# Patient Record
Sex: FEMALE | Race: WHITE | Employment: STUDENT | URBAN - METROPOLITAN AREA
[De-identification: names, ages, dates, MRNs, and addresses within clinical notes are randomized per-mention and may not be internally consistent; named-entity substitution may affect disease eponyms.]

---
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
## 2018-02-12 ENCOUNTER — OFFICE VISIT (OUTPATIENT)
Dept: FAMILY MEDICINE CLINIC | Age: 18
End: 2018-02-12

## 2018-02-12 VITALS
BODY MASS INDEX: 25.71 KG/M2 | SYSTOLIC BLOOD PRESSURE: 109 MMHG | OXYGEN SATURATION: 100 % | HEIGHT: 66 IN | WEIGHT: 160 LBS | TEMPERATURE: 98 F | DIASTOLIC BLOOD PRESSURE: 71 MMHG | HEART RATE: 57 BPM | RESPIRATION RATE: 18 BRPM

## 2018-02-12 DIAGNOSIS — S46.812A STRAIN OF LEFT LEVATOR SCAPULAE MUSCLE, INITIAL ENCOUNTER: Primary | ICD-10-CM

## 2018-02-12 DIAGNOSIS — M70.50 PES ANSERINE BURSITIS: ICD-10-CM

## 2018-02-12 RX ORDER — MELOXICAM 7.5 MG/1
7.5 TABLET ORAL DAILY
Qty: 30 TAB | Refills: 1 | Status: SHIPPED | OUTPATIENT
Start: 2018-02-12 | End: 2018-02-22 | Stop reason: DRUGHIGH

## 2018-02-12 NOTE — MR AVS SNAPSHOT
Zeferino Schneiderrichi [facility] [address] [facility] [address] [address] 
[phone] Patient: Sheree Rosa MRN: [id number] :2000 Visit Information Date & Time Provider Department Dept. Phone Encounter #  
 2018  2:15 PM Bari Zheng PA-C [address] [place] [address] [phone] [id number] Follow-up Instructions Return if symptoms worsen or fail to improve. Upcoming Health Maintenance Date Due Hepatitis A Peds Age 1-18 (1 of 2 - Standard Series) 1999 DTaP/Tdap/Td series (1 - Tdap) 2005 HPV AGE 9Y-26Y (1 of 3 - Female 3 Dose Series) 2009 Influenza Age 5 to Adult 2017 Allergies as of 2018  Review Complete On: 2018 By: Bari Zheng PA-C No Known Allergies Current Immunizations  Never Reviewed No immunizations on file. Not reviewed this visit You Were Diagnosed With   
  
 Codes Comments Strain of left levator scapulae muscle, initial encounter    -  Primary ICD-10-CM: Q31.874B ICD-9-CM: 840.8 Pes anserine bursitis     ICD-10-CM: M70.50 ICD-9-CM: 726.61 Vitals BP Pulse Temp Resp Height(growth percentile) Weight(growth percentile) 109/71 (46 %/ 75 %)* (BP 1 Location: Right arm, BP Patient Position: Sitting) (!) 57 98 °F (36.7 °C) (Oral) 18 5' 6\" (1.676 m) (75 %, Z= 0.67) 160 lb (72.6 kg) (87 %, Z= 1.13) LMP SpO2 BMI OB Status Smoking Status 2018 100% 25.82 kg/m2 (83 %, Z= 0.94) Having regular periods Never Smoker *BP percentiles are based on NHBPEP's 4th Report Growth percentiles are based on CDC 2-20 Years data. Vitals History BMI and BSA Data Body Mass Index Body Surface Area  
 25.82 kg/m 2 1.84 m 2 Preferred Pharmacy Pharmacy Name Phone [id number] [address] [place] [phone] Your Updated Medication List  
  
   
 This list is accurate as of: 2/12/18  3:18 PM.  Always use your most recent med list.  
  
  
  
  
 meloxicam 7.5 mg tablet Commonly known as:  MOBIC Take 1 Tab by mouth daily. ORSYTHIA 0.1-20 mg-mcg Tab Generic drug:  levonorgestrel-ethinyl estradiol Take  by mouth. ZyrTEC 10 mg tablet Generic drug:  cetirizine Take  by mouth. Prescriptions Sent to Pharmacy Refills  
 meloxicam (MOBIC) 7.5 mg tablet 1 Sig: Take 1 Tab by mouth daily. Class: Normal  
 Pharmacy: [address] [place] [address], [address] [place] [address] #: [phone] Route: Oral  
  
Follow-up Instructions Return if symptoms worsen or fail to improve. Patient Instructions For the shoulder: warm it up and stretch it daily. For the knee: warm it up prior to physical activity, static stretches after activity, then ice: 15-20 minutes every 1-2 hours. Take 2 Meloxicam daily with food. Avoid other NSAIDs (aspirin, Aleve, ibuprofen, Motrin, etc.). It's okay to take with Tylenol. Neck Strain or Sprain: Rehab Exercises Your Care Instructions Here are some examples of typical rehabilitation exercises for your condition. Start each exercise slowly. Ease off the exercise if you start to have pain. Your doctor or physical therapist will tell you when you can start these exercises and which ones will work best for you. How to do the exercises Neck rotation 1. Sit in a firm chair, or stand up straight. 2. Keeping your chin level, turn your head to the right, and hold for 15 to 30 seconds. 3. Turn your head to the left and hold for 15 to 30 seconds. 4. Repeat 2 to 4 times to each side. Neck stretches 1. Look straight ahead, and tip your right ear to your right shoulder. Do not let your left shoulder rise up as you tip your head to the right. 2. Hold for 15 to 30 seconds. 3. Tilt your head to the left.  Do not let your right shoulder rise up as you tip your head to the left. 4. Hold for 15 to 30 seconds. 5. Repeat 2 to 4 times to each side. Forward neck flexion 1. Sit in a firm chair, or stand up straight. 2. Bend your head forward. 3. Hold for 15 to 30 seconds. 4. Repeat 2 to 4 times. Lateral (side) bend strengthening 1. With your right hand, place your first two fingers on your right temple. 2. Start to bend your head to the side while using gentle pressure from your fingers to keep your head from bending. 3. Hold for about 6 seconds. 4. Repeat 8 to 12 times. 5. Switch hands and repeat the same exercise on your left side. Forward bend strengthening 1. Place your first two fingers of either hand on your forehead. 2. Start to bend your head forward while using gentle pressure from your fingers to keep your head from bending. 3. Hold for about 6 seconds. 4. Repeat 8 to 12 times. Neutral position strengthening 1. Using one hand, place your fingertips on the back of your head at the top of your neck. 2. Start to bend your head backward while using gentle pressure from your fingers to keep your head from bending. 3. Hold for about 6 seconds. 4. Repeat 8 to 12 times. Chin tuck 1. Lie on the floor with a rolled-up towel under your neck. Your head should be touching the floor. 2. Slowly bring your chin toward your chest. 
3. Hold for a count of 6, and then relax for up to 10 seconds. 4. Repeat 8 to 12 times. Follow-up care is a key part of your treatment and safety. Be sure to make and go to all appointments, and call your doctor if you are having problems. It's also a good idea to know your test results and keep a list of the medicines you take. Where can you learn more? Go to http://radha-murray.info/. Enter M679 in the search box to learn more about \"Neck Strain or Sprain: Rehab Exercises. \" Current as of: March 21, 2017 Content Version: 11.4 © 4091-3374 Healthwise, Incorporated. Care instructions adapted under license by [employer] (which disclaims liability or warranty for this information). If you have questions about a medical condition or this instruction, always ask your healthcare professional. [address] any warranty or liability for your use of this information. Follow along with Shannon Causey after warming up the neck/shoulder area:  
[email or web address] Fastest Neck Stretch to Stop Pain from Levator Scapulae Perform the exercises above 1-2 times daily (more if it feels good). Try them all for a few days, then you can skip the ones that don't seem helpful. Watch your posture. Introducing [facility]! Carmelita Fothergill introduces [employer] patient portal. Now you can access parts of your medical record, email your doctor's office, and request medication refills online. 1. In your internet browser, go to [email or web address]. [employer] 2. Click on the First Time User? Click Here link in the Sign In box. You will see the New Member Sign Up page. 3. Enter your [employer] Access Code exactly as it appears below. You will not need to use this code after youve completed the sign-up process. If you do not sign up before the expiration date, you must request a new code. · [employer] Access Code: [email or web address] Expires: 2/15/2018  3:05 PM 
 
4. Enter the last four digits of your Social Security Number (xxxx) and Date of Birth (mm/dd/yyyy) as indicated and click Submit. You will be taken to the next sign-up page. 5. Create a Quad/Graphicst ID. This will be your [employer] login ID and cannot be changed, so think of one that is secure and easy to remember. 6. Create a [employer] password. You can change your password at any time. 7. Enter your Password Reset Question and Answer. This can be used at a later time if you forget your password. 8. Enter your e-mail address. You will receive e-mail notification when new information is available in [address] 9. Click Sign Up. You can now view and download portions of your medical record. 10. Click the Download Summary menu link to download a portable copy of your medical information. If you have questions, please visit the Frequently Asked Questions section of the [employer] website. Remember, [employer] is NOT to be used for urgent needs. For medical emergencies, dial 911. Now available from your iPhone and Android! Please provide this summary of care documentation to your next provider. If you have any questions after today's visit, please call [phone].

## 2018-02-12 NOTE — LETTER
NOTIFICATION RETURN TO WORK 
 
2/12/2018 3:14 PM 
 
Ms. Cira Fragoso [place] [address] [place] [address] To Whom It May Concern: 
 
Cira Fragoso is currently under the care of [facility]. Please excuse her from running and rucking for the next week. If there are questions or concerns please have the patient contact our office. Sincerely, Manjeet Ardon PA-C

## 2018-02-12 NOTE — PROGRESS NOTES
Rm: 8    Chief Complaint   Patient presents with    Knee Pain     left knee x 4 days    Shoulder Pain     left shoulder x 4yrs     Flu Shot Requested: no    Depression Screening:  PHQ over the last two weeks 2/12/2018 11/17/2017   Little interest or pleasure in doing things Not at all Not at all   Feeling down, depressed or hopeless Not at all Not at all   Total Score PHQ 2 0 0       Learning Assessment:  Learning Assessment 11/17/2017   PRIMARY LEARNER Patient   HIGHEST LEVEL OF EDUCATION - PRIMARY LEARNER  SOME COLLEGE   BARRIERS PRIMARY LEARNER NONE   CO-LEARNER CAREGIVER No   PRIMARY LANGUAGE ENGLISH   LEARNER PREFERENCE PRIMARY LISTENING   ANSWERED BY patient   RELATIONSHIP SELF       Abuse Screening:  No flowsheet data found. Health Maintenance reviewed and discussed per provider: yes     Coordination of Care:    1. Have you been to the ER, urgent care clinic since your last visit? Hospitalized since your last visit? no    2. Have you seen or consulted any other health care providers outside of the [address] [place] [address] since your last visit? Include any pap smears or colon screening.  no

## 2018-02-12 NOTE — PATIENT INSTRUCTIONS
For the shoulder: warm it up and stretch it daily. For the knee: warm it up prior to physical activity, static stretches after activity, then ice: 15-20 minutes every 1-2 hours. Take 2 Meloxicam daily with food. Avoid other NSAIDs (aspirin, Aleve, ibuprofen, Motrin, etc.). It's okay to take with Tylenol. Neck Strain or Sprain: Rehab Exercises  Your Care Instructions  Here are some examples of typical rehabilitation exercises for your condition. Start each exercise slowly. Ease off the exercise if you start to have pain. Your doctor or physical therapist will tell you when you can start these exercises and which ones will work best for you. How to do the exercises  Neck rotation    1. Sit in a firm chair, or stand up straight. 2. Keeping your chin level, turn your head to the right, and hold for 15 to 30 seconds. 3. Turn your head to the left and hold for 15 to 30 seconds. 4. Repeat 2 to 4 times to each side. Neck stretches    1. Look straight ahead, and tip your right ear to your right shoulder. Do not let your left shoulder rise up as you tip your head to the right. 2. Hold for 15 to 30 seconds. 3. Tilt your head to the left. Do not let your right shoulder rise up as you tip your head to the left. 4. Hold for 15 to 30 seconds. 5. Repeat 2 to 4 times to each side. Forward neck flexion    1. Sit in a firm chair, or stand up straight. 2. Bend your head forward. 3. Hold for 15 to 30 seconds. 4. Repeat 2 to 4 times. Lateral (side) bend strengthening    1. With your right hand, place your first two fingers on your right temple. 2. Start to bend your head to the side while using gentle pressure from your fingers to keep your head from bending. 3. Hold for about 6 seconds. 4. Repeat 8 to 12 times. 5. Switch hands and repeat the same exercise on your left side. Forward bend strengthening    1. Place your first two fingers of either hand on your forehead.   2. Start to bend your head forward while using gentle pressure from your fingers to keep your head from bending. 3. Hold for about 6 seconds. 4. Repeat 8 to 12 times. Neutral position strengthening    1. Using one hand, place your fingertips on the back of your head at the top of your neck. 2. Start to bend your head backward while using gentle pressure from your fingers to keep your head from bending. 3. Hold for about 6 seconds. 4. Repeat 8 to 12 times. Chin tuck    1. Lie on the floor with a rolled-up towel under your neck. Your head should be touching the floor. 2. Slowly bring your chin toward your chest.  3. Hold for a count of 6, and then relax for up to 10 seconds. 4. Repeat 8 to 12 times. Follow-up care is a key part of your treatment and safety. Be sure to make and go to all appointments, and call your doctor if you are having problems. It's also a good idea to know your test results and keep a list of the medicines you take. Where can you learn more? Go to http://radha-murray.info/. Enter M679 in the search box to learn more about \"Neck Strain or Sprain: Rehab Exercises. \"  Current as of: March 21, 2017  Content Version: 11.4  © 2468-9132 Healthwise, Incorporated. Care instructions adapted under license by [employer] (which disclaims liability or warranty for this information). If you have questions about a medical condition or this instruction, always ask your healthcare professional. [place] [address] any warranty or liability for your use of this information. Follow along with Jose Saldaña after warming up the neck/shoulder area:   [email or web address]://[email or web address]  Fastest Neck Stretch to Stop Pain from Levator Scapulae    Perform the exercises above 1-2 times daily (more if it feels good). Try them all for a few days, then you can skip the ones that don't seem helpful. Watch your posture.

## 2018-02-12 NOTE — PROGRESS NOTES
HISTORY OF PRESENT ILLNESS  Dick Grant is a 21 y.o. female. HPI Comments: Pt presents with left knee and shoulder pain. She is in 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 Ibuprofen has been somewhat helpful. States the shoulder injury has been present since high school, where she played volleyball and soccer. Complains of 6/10 aching pain in the shoulder. She thinks she may have injured it with VB. Denies any specific injury diagnosis. The knee pain has been ongoing for about the past two weeks as she has been increasing her physical training. There is 5/10 aching pain in the knee, which becomes 7/10 sharp pain with walking and running. She was experiencing some soreness in that knee prior to falling in a hole (just above the knee in depth) during training, after which it hurt worse. She is not aware of any popping/clicking in the knee; or any twisting injury. She has been using an ACE wrap to support the knee which, has helped some. Knee Pain     Shoulder Pain    Pertinent negatives include no tingling. Review of Systems   Constitutional: Negative for chills and fever. Musculoskeletal: Positive for joint pain (left knee, left shoulder). Negative for back pain, myalgias and neck pain. Neurological: Negative for tingling. Visit Vitals    /71 (BP 1 Location: Right arm, BP Patient Position: Sitting)    Pulse (!) 57    Temp 98 °F (36.7 °C) (Oral)    Resp 18    Ht 5' 6\" (1.676 m)    Wt 160 lb (72.6 kg)    LMP 02/05/2018    SpO2 100%    BMI 25.82 kg/m2       Physical Exam   Constitutional: She is oriented to person, place, and time. Vital signs are normal. She appears well-developed and well-nourished. She is cooperative. She does not appear ill. No distress. HENT:   Head: Normocephalic and atraumatic. Right Ear: External ear normal.   Left Ear: External ear normal.   Nose: Nose normal. No nasal deformity.    Eyes: Conjunctivae are normal.   Neck: Trachea normal and normal range of motion. Neck supple. No spinous process tenderness present. No rigidity. Cardiovascular: Regular rhythm. Bradycardia present. Pulses:       Radial pulses are 2+ on the right side, and 2+ on the left side. Mild athletic bradycardia   Pulmonary/Chest: Effort normal. No respiratory distress. Musculoskeletal:        Right shoulder: Normal. She exhibits normal range of motion, no tenderness, no swelling and no deformity. Left shoulder: Normal. She exhibits normal range of motion, no swelling, no effusion and no crepitus. Left hip: Normal. She exhibits normal range of motion, no swelling, no crepitus and no deformity. Right knee: Normal. She exhibits normal range of motion, no swelling and no deformity. No tenderness found. Left knee: She exhibits normal range of motion, no swelling, no effusion, no deformity, normal alignment, no LCL laxity, normal patellar mobility and no MCL laxity. Tenderness (just distal to the medial condyle) found. No medial joint line, no lateral joint line and no patellar tendon tenderness noted. Left ankle: Normal. She exhibits no swelling, no ecchymosis and no deformity. Cervical back: Normal. She exhibits normal range of motion, no tenderness, no bony tenderness, no swelling, no edema and no deformity. Pt pain/tenderness is in the area of the left levator scapula muscle (midline neck to medial border of scapula near the superior angle). Stretching the right levator scap does not cause discomfort, but there is increased pain with stretching of the left levator scap. No abnormality noted in the shoulder joint proper   Lymphadenopathy:     She has no cervical adenopathy. Neurological: She is alert and oriented to person, place, and time.  strength normal and symmetric   Skin: Skin is warm, dry and intact. Ecchymosis noted.    Large area of yellow/purple bruising noted in the area of the left triceps   Psychiatric: She has a normal mood and affect. Her speech is normal and behavior is normal. Thought content normal.   Nursing note and vitals reviewed. ASSESSMENT and PLAN    ICD-10-CM ICD-9-CM    1. Strain of left levator scapulae muscle, initial encounter S46.812A 840.8 meloxicam (MOBIC) 7.5 mg tablet   2. Pes anserine bursitis M70.50 726.61 meloxicam (MOBIC) 7.5 mg tablet     Recommendations:  > For the shoulder: warm it up and stretch it daily. > For the knee: warm it up prior to physical activity, static stretches after activity, then ice: 15-20 minutes every 1-2 hours. > Take 2 Meloxicam daily with food. Avoid other NSAIDs (aspirin, Aleve, ibuprofen, Motrin, etc.). It's okay to take with Tylenol. Provided after-visit information on  Neck Strain or Sprain: Rehab Exercises    Also recommended a video from [employer] and [place] on relieving levator scapula pain. Pt verbalized understanding and agreement with the plan of care.     Kermit Claude, PA-C

## 2018-02-22 ENCOUNTER — OFFICE VISIT (OUTPATIENT)
Dept: ORTHOPEDIC SURGERY | Age: 18
End: 2018-02-22

## 2018-02-22 VITALS
WEIGHT: 161 LBS | HEART RATE: 75 BPM | HEIGHT: 66 IN | SYSTOLIC BLOOD PRESSURE: 117 MMHG | TEMPERATURE: 98 F | DIASTOLIC BLOOD PRESSURE: 73 MMHG | BODY MASS INDEX: 25.88 KG/M2 | RESPIRATION RATE: 15 BRPM

## 2018-02-22 DIAGNOSIS — M99.08 RIB CAGE REGION SOMATIC DYSFUNCTION: ICD-10-CM

## 2018-02-22 DIAGNOSIS — M70.50 PES ANSERINE BURSITIS: ICD-10-CM

## 2018-02-22 DIAGNOSIS — M99.02 THORACIC REGION SOMATIC DYSFUNCTION: ICD-10-CM

## 2018-02-22 DIAGNOSIS — M22.2X2 PATELLOFEMORAL DISORDER, LEFT: Primary | ICD-10-CM

## 2018-02-22 DIAGNOSIS — M99.05 PELVIC SOMATIC DYSFUNCTION: ICD-10-CM

## 2018-02-22 RX ORDER — MELOXICAM 15 MG/1
TABLET ORAL
Qty: 90 TAB | Refills: 0 | Status: SHIPPED | OUTPATIENT
Start: 2018-02-22 | End: 2018-05-18 | Stop reason: ALTCHOICE

## 2018-02-22 RX ORDER — MELOXICAM 15 MG/1
TABLET ORAL
Qty: 90 TAB | Refills: 0 | Status: SHIPPED | OUTPATIENT
Start: 2018-02-22 | End: 2018-02-22 | Stop reason: SDUPTHER

## 2018-02-22 NOTE — PROGRESS NOTES
HISTORY OF PRESENT ILLNESS    Eliezer Lobato is a 21y.o. year old female comes in today as new patient for: left knee pain    Patients symptoms have been present for 2-3 weeks. Pain level 6/10 now, It has worsened with sit for a while then stand and runing but not every time. Patient has tried:  rest and ice. It is described as pain left knee worsened with [employer] marching and running for [employer] the fell into a hole at [facility] about a week ago and worsened. No swell. Also pain left upper shoulder with running. Social History     Social History    Marital status: SINGLE     Spouse name: N/A    Number of children: N/A    Years of education: N/A     Social History Main Topics    Smoking status: Never Smoker    Smokeless tobacco: Never Used    Alcohol use Yes      Comment: occ    Drug use: No    Sexual activity: Yes     Partners: Male     Birth control/ protection: Condom     Other Topics Concern    None     Social History Narrative     Current Outpatient Prescriptions   Medication Sig Dispense Refill    meloxicam (MOBIC) 7.5 mg tablet Take 1 Tab by mouth daily. 30 Tab 1    cetirizine (ZYRTEC) 10 mg tablet Take  by mouth.  levonorgestrel-ethinyl estradiol (ORSYTHIA) 0.1-20 mg-mcg tab Take  by mouth. History reviewed. No pertinent past medical history. Family History   Problem Relation Age of Onset    Cancer Mother     Thyroid Disease Father          ROS:  No numb. All other systems reviewed and negative aside from that written in the HPI. Objective:  Visit Vitals    /73    Pulse 75    Temp 98 °F (36.7 °C)    Resp 15    Ht 5' 6\" (1.676 m)    Wt 161 lb (73 kg)    LMP 02/05/2018    BMI 25.99 kg/m2     GEN: Appears stated age in NAD. HEAD:  Normocephalic, atraumatic. NEURO:  Sensation intact light touch B/L lower extremities. MS:  LE Strength +5/5 bilateral .   left Knee:  No Effusion . Lachman negative. Valgus negative. Varus negative.   negative joint line tenderness medial.  Francis negative. Posterior drawer negative. Noble compression test negative. Patellar apprehension negative. Patellar facet  positive tender to palpation medial no crepitance left. ASIS/med mall/iliac crest high left. TTA T3 left and rib 3 posterior left. Examined seated and supine. EXT: no clubbing/cyanosis. no edema. SKIN: Warm/dry without rash. HEENT: Conjunctiva/lids WNL. External canals/nares WNL. Tongue midline. PERRL, EOMI. Hearing intact. NECK: Trachea midline. Supple, Full ROM. No thyromegaly. CARDIAC: No edema. LUNGS: Normal effort. ABD: Soft, no masses. No HSM. PSYCH: A+O x3. Appropriate judgment and insight. Assessment/Plan:   Encounter Diagnoses   Name Primary?  Patellofemoral disorder, left Yes    Pes anserine bursitis     Thoracic region somatic dysfunction     Rib cage region somatic dysfunction     Pelvic somatic dysfunction      Orders Placed This Encounter    REFERRAL TO PHYSICAL THERAPY     Referral Type:   PT/OT/ST     Referral Reason:   Specialty Services Required    TN OSTEOPATHIC MANIP,3-4 BODY REGN    meloxicam (MOBIC) 15 mg tablet     Sig: Take 1 tab daily as needed pain with food. Dispense:  90 Tab     Refill:  0       Verbal consent obtained. Thoracic, Rib and Pelvic SD treated with ME, HVLA and Indirect. Correction of previous malalignments verified after Tx. Pt tolerated well. Notes improvement of Sx and pain is now rated 2-3/10. HEP/stretches daily. Discussed stretching/strengthening/posture. Patient verbalizes understanding of evaluation and plan. Will start PT and use mobic and ice and RTC 4 weeks.

## 2018-02-22 NOTE — PATIENT INSTRUCTIONS
[email or web address]    Search YouTube for my channel:    Dr. Vidhya Corley    Neck/Upper back    Runner's Knee         Patellofemoral Pain Syndrome (Runner's Knee): Exercises  Your Care Instructions  Here are some examples of typical rehabilitation exercises for your condition. Start each exercise slowly. Ease off the exercise if you start to have pain. Your doctor or physical therapist will tell you when you can start these exercises and which ones will work best for you. How to do the exercises  Calf wall stretch    1. Stand facing a wall with your hands on the wall at about eye level. Put your affected leg about a step behind your other leg. 2. Keeping your back leg straight and your back heel on the floor, bend your front knee and gently bring your hip and chest toward the wall until you feel a stretch in the calf of your back leg. 3. Hold the stretch for at least 15 to 30 seconds. 4. Repeat 2 to 4 times. 5. Repeat steps 1 through 4, but this time keep your back knee bent. Quadriceps stretch    1. If you are not steady on your feet, hold on to a chair, counter, or wall. 2. Bend your affected leg, and reach behind you to grab the front of your foot or ankle with the hand on the same side. For example, if you are stretching your right leg, use your right hand. 3. Keeping your knees next to each other, pull your foot toward your buttock until you feel a gentle stretch across the front of your hip and down the front of your thigh. Your knee should be pointed directly to the ground, and not out to the side. 4. Hold the stretch for at least 15 to 30 seconds. 5. Repeat 2 to 4 times. Hamstring wall stretch    1. Lie on your back in a doorway, with your good leg through the open door. 2. Slide your affected leg up the wall to straighten your knee. You should feel a gentle stretch down the back of your leg. 1. Do not arch your back. 2. Do not bend either knee.   3. Keep one heel touching the floor and the other heel touching the wall. Do not point your toes. 3. Hold the stretch for at least 1 minute. Then over time, try to lengthen the time you hold the stretch to as long as 6 minutes. 4. Repeat 2 to 4 times. 5. If you do not have a place to do this exercise in a doorway, there is another way to do it:  6. Lie on your back, and bend your affected leg. 7. Loop a towel under the ball and toes of that foot, and hold the ends of the towel in your hands. 8. Straighten your knee, and slowly pull back on the towel. You should feel a gentle stretch down the back of your leg. 9. Hold the stretch for at least 15 to 30 seconds. Or even better, hold the stretch for 1 minute if you can. 10. Repeat 2 to 4 times. Quad sets    1. Sit with your affected leg straight and supported on the floor or a firm bed. Place a small, rolled-up towel under your affected knee. Your other leg should be bent, with that foot flat on the floor. 2. Tighten the thigh muscles of your affected leg by pressing the back of your knee down into the towel. 3. Hold for about 6 seconds, then rest for up to 10 seconds. 4. Repeat 8 to 12 times. Straight-leg raises to the front    1. Lie on your back with your good knee bent so that your foot rests flat on the floor. Your affected leg should be straight. Make sure that your low back has a normal curve. You should be able to slip your hand in between the floor and the small of your back, with your palm touching the floor and your back touching the back of your hand. 2. Tighten the thigh muscles in your affected leg by pressing the back of your knee flat down to the floor. Hold your knee straight. 3. Keeping the thigh muscles tight and your leg straight, lift your affected leg up so that your heel is about 12 inches off the floor. 4. Hold for about 6 seconds, then lower your leg slowly. Rest for up to 10 seconds between repetitions.   5. Repeat 8 to 12 times.  Straight-leg raises to the back    1. Lie on your stomach, and lift your leg straight up behind you (toward the ceiling). 2. Lift your toes about 6 inches off the floor, hold for about 6 seconds, then lower slowly. 3. Do 8 to 12 repetitions. Wall slide with ball squeeze    1. Stand with your back against a wall and with your feet about shoulder-width apart. Your feet should be about 12 inches away from the wall. 2. Put a ball about the size of a soccer ball between your knees. Then slowly slide down the wall until your knees are bent about 20 to 30 degrees. 3. Tighten your thigh muscles by squeezing the ball between your knees. Hold that position for about 10 seconds, then stop squeezing. Rest for up to 10 seconds between repetitions. 4. Repeat 8 to 12 times. Follow-up care is a key part of your treatment and safety. Be sure to make and go to all appointments, and call your doctor if you are having problems. It's also a good idea to know your test results and keep a list of the medicines you take. Where can you learn more? Go to http://radha-murray.info/. Enter A404 in the search box to learn more about \"Patellofemoral Pain Syndrome (Runner's Knee): Exercises. \"  Current as of: March 21, 2017  Content Version: 11.4  © 8075-4813 [employer]. Care instructions adapted under license by [employer] (which disclaims liability or warranty for this information). If you have questions about a medical condition or this instruction, always ask your healthcare professional. [place] [address] any warranty or liability for your use of this information.

## 2018-02-22 NOTE — LETTER
2/22/2018 8:59 AM 
 
Ms. Juan Francisco Dill [place] [facility] [address] [address] Run and march at own pace and distance. Like resolved in 3-4 weeks.  
 
 
Sincerely, 
 
 
Mae Cabral, DO

## 2018-02-22 NOTE — MR AVS SNAPSHOT
[address] [place] [address] [place], [address] [facility] [id number] 
[phone] Patient: Anderson Mulligan MRN: [id number] :2000 Visit Information Date & Time Provider Department Dept. Phone Encounter #  
 2018  8:20 AM Aparna Price, [address] [facility] [phone] [id number] Upcoming Health Maintenance Date Due Hepatitis A Peds Age 1-18 (1 of 2 - Standard Series) 1999 DTaP/Tdap/Td series (1 - Tdap) 2005 HPV AGE 9Y-26Y (1 of 3 - Female 3 Dose Series) 2009 Influenza Age 5 to Adult 2017 Allergies as of 2018  Review Complete On: 2018 By: Aparna Price DO No Known Allergies Current Immunizations  Never Reviewed No immunizations on file. Not reviewed this visit You Were Diagnosed With   
  
 Codes Comments Patellofemoral disorder, left    -  Primary ICD-10-CM: M22.2X2 ICD-9-CM: 719.96 Pes anserine bursitis     ICD-10-CM: M70.50 ICD-9-CM: 726.61 Thoracic region somatic dysfunction     ICD-10-CM: M99.02 
ICD-9-CM: 739.2 Rib cage region somatic dysfunction     ICD-10-CM: M99.08 
ICD-9-CM: 739.8 Pelvic somatic dysfunction     ICD-10-CM: M99.05 
ICD-9-CM: 739.5 Vitals BP Pulse Temp Resp Height(growth percentile) Weight(growth percentile) 117/73 (75 %/ 80 %)* 75 98 °F (36.7 °C) 15 5' 6\" (1.676 m) (75 %, Z= 0.67) 161 lb (73 kg) (88 %, Z= 1.16) LMP BMI OB Status Smoking Status 2018 25.99 kg/m2 (83 %, Z= 0.97) Having regular periods Never Smoker *BP percentiles are based on NHBPEP's 4th Report Growth percentiles are based on CDC 2-20 Years data. Vitals History BMI and BSA Data Body Mass Index Body Surface Area  
 25.99 kg/m 2 1.84 m 2 Preferred Pharmacy Pharmacy Name Phone [facility]Y #[id number] [place], [address] [facility] Dann Johnson [phone] Your Updated Medication List  
  
   
 This list is accurate as of 2/22/18  9:03 AM.  Always use your most recent med list.  
  
  
  
  
 meloxicam 15 mg tablet Commonly known as:  MOBIC Take 1 tab daily as needed pain with food. ORSYTHIA 0.1-20 mg-mcg Tab Generic drug:  levonorgestrel-ethinyl estradiol Take  by mouth. ZyrTEC 10 mg tablet Generic drug:  cetirizine Take  by mouth. Prescriptions Sent to Pharmacy Refills  
 meloxicam (MOBIC) 15 mg tablet 0 Sig: Take 1 tab daily as needed pain with food. Class: Normal  
 Pharmacy: [address] [place] [address], [address] [place] [address] #: [phone] We Performed the Following NE OSTEOPATHIC MANIP,3-4 BODY REGN [id number] CPT(R)] REFERRAL TO PHYSICAL THERAPY [QLA63 Custom] Comments:  
 Eval and Tx 
 
2-3 per week for 3-4 weeks 
 
 
[phone] Referral Information Referral ID Referred By Referred To  
  
 [id number] [facility]   
   [address] [facility] [address] [facility], [address] [place] [address] Phone: [phone] Fax: [phone] Visits Status Start Date End Date 1 New Request 2/22/18 2/22/19 If your referral has a status of pending review or denied, additional information will be sent to support the outcome of this decision. Patient Instructions   
[email or web address] Search YouTube for my channel: 
 
Dr. Erma Villalpando Neck/Upper back Runner's Knee Patellofemoral Pain Syndrome (Runner's Knee): Exercises Your Care Instructions Here are some examples of typical rehabilitation exercises for your condition. Start each exercise slowly. Ease off the exercise if you start to have pain. Your doctor or physical therapist will tell you when you can start these exercises and which ones will work best for you. How to do the exercises Calf wall stretch 1. Stand facing a wall with your hands on the wall at about eye level.  Put your affected leg about a step behind your other leg. 2. Keeping your back leg straight and your back heel on the floor, bend your front knee and gently bring your hip and chest toward the wall until you feel a stretch in the calf of your back leg. 3. Hold the stretch for at least 15 to 30 seconds. 4. Repeat 2 to 4 times. 5. Repeat steps 1 through 4, but this time keep your back knee bent. Quadriceps stretch 1. If you are not steady on your feet, hold on to a chair, counter, or wall. 2. Bend your affected leg, and reach behind you to grab the front of your foot or ankle with the hand on the same side. For example, if you are stretching your right leg, use your right hand. 3. Keeping your knees next to each other, pull your foot toward your buttock until you feel a gentle stretch across the front of your hip and down the front of your thigh. Your knee should be pointed directly to the ground, and not out to the side. 4. Hold the stretch for at least 15 to 30 seconds. 5. Repeat 2 to 4 times. Hamstring wall stretch 1. Lie on your back in a doorway, with your good leg through the open door. 2. Slide your affected leg up the wall to straighten your knee. You should feel a gentle stretch down the back of your leg. 1. Do not arch your back. 2. Do not bend either knee. 3. Keep one heel touching the floor and the other heel touching the wall. Do not point your toes. 3. Hold the stretch for at least 1 minute. Then over time, try to lengthen the time you hold the stretch to as long as 6 minutes. 4. Repeat 2 to 4 times. 5. If you do not have a place to do this exercise in a doorway, there is another way to do it: 6. Lie on your back, and bend your affected leg. 7. Loop a towel under the ball and toes of that foot, and hold the ends of the towel in your hands. 8. Straighten your knee, and slowly pull back on the towel. You should feel a gentle stretch down the back of your leg. 9. Hold the stretch for at least 15 to 30 seconds. Or even better, hold the stretch for 1 minute if you can. 10. Repeat 2 to 4 times. [facility] 1. Sit with your affected leg straight and supported on the floor or a firm bed. Place a small, rolled-up towel under your affected knee. Your other leg should be bent, with that foot flat on the floor. 2. Tighten the thigh muscles of your affected leg by pressing the back of your knee down into the towel. 3. Hold for about 6 seconds, then rest for up to 10 seconds. 4. Repeat 8 to 12 times. Straight-leg raises to the front 1. Lie on your back with your good knee bent so that your foot rests flat on the floor. Your affected leg should be straight. Make sure that your low back has a normal curve. You should be able to slip your hand in between the floor and the small of your back, with your palm touching the floor and your back touching the back of your hand. 2. Tighten the thigh muscles in your affected leg by pressing the back of your knee flat down to the floor. Hold your knee straight. 3. Keeping the thigh muscles tight and your leg straight, lift your affected leg up so that your heel is about 12 inches off the floor. 4. Hold for about 6 seconds, then lower your leg slowly. Rest for up to 10 seconds between repetitions. 5. Repeat 8 to 12 times. Straight-leg raises to the back 1. Lie on your stomach, and lift your leg straight up behind you (toward the ceiling). 2. Lift your toes about 6 inches off the floor, hold for about 6 seconds, then lower slowly. 3. Do 8 to 12 repetitions. Wall slide with ball squeeze 1. Stand with your back against a wall and with your feet about shoulder-width apart. Your feet should be about 12 inches away from the wall. 2. Put a ball about the size of a soccer ball between your knees. Then slowly slide down the wall until your knees are bent about 20 to 30 degrees. 3. Tighten your thigh muscles by squeezing the ball between your knees. Hold that position for about 10 seconds, then stop squeezing. Rest for up to 10 seconds between repetitions. 4. Repeat 8 to 12 times. Follow-up care is a key part of your treatment and safety. Be sure to make and go to all appointments, and call your doctor if you are having problems. It's also a good idea to know your test results and keep a list of the medicines you take. Where can you learn more? Go to http://radha-murray.info/. Enter A404 in the search box to learn more about \"Patellofemoral Pain Syndrome (Runner's Knee): Exercises. \" Current as of: March 21, 2017 Content Version: 11.4 © 7088-8559 Healthwise, Incorporated. Care instructions adapted under license by [employer] (which disclaims liability or warranty for this information). If you have questions about a medical condition or this instruction, always ask your healthcare professional. [place] [address] any warranty or liability for your use of this information. Introducing [facility] & HEALTH SERVICES! [facility] introduces Celotor patient portal. Now you can access parts of your medical record, email your doctor's office, and request medication refills online. 1. In your internet browser, go to [email or web address] 2. Click on the First Time User? Click Here link in the Sign In box. You will see the New Member Sign Up page. 3. Enter your Celotor Access Code exactly as it appears below. You will not need to use this code after youve completed the sign-up process. If you do not sign up before the expiration date, you must request a new code. · [employer]otor Access Code: [email or web address] Expires: 5/23/2018  8:05 AM 
 
4. Enter the last four digits of your Social Security Number (xxxx) and Date of Birth (mm/dd/yyyy) as indicated and click Submit. You will be taken to the next sign-up page. 5. Create a Temptster ID. This will be your Temptster login ID and cannot be changed, so think of one that is secure and easy to remember. 6. Create a Temptster password. You can change your password at any time. 7. Enter your Password Reset Question and Answer. This can be used at a later time if you forget your password. 8. Enter your e-mail address. You will receive e-mail notification when new information is available in [address] 9. Click Sign Up. You can now view and download portions of your medical record. 10. Click the Download Summary menu link to download a portable copy of your medical information. If you have questions, please visit the Frequently Asked Questions section of the Temptster website. Remember, Temptster is NOT to be used for urgent needs. For medical emergencies, dial 911. Now available from your iPhone and Android! Please provide this summary of care documentation to your next provider. If you have any questions after today's visit, please call [phone].

## 2018-03-06 ENCOUNTER — HOSPITAL ENCOUNTER (OUTPATIENT)
Dept: PHYSICAL THERAPY | Age: 18
Discharge: HOME | End: 2018-03-06
Payer: OTHER GOVERNMENT

## 2018-03-06 PROCEDURE — 97110 THERAPEUTIC EXERCISES: CPT

## 2018-03-06 PROCEDURE — 97161 PT EVAL LOW COMPLEX 20 MIN: CPT

## 2018-03-06 NOTE — PROGRESS NOTES
PHYSICAL THERAPY - DAILY TREATMENT NOTE    Patient Name: Geraldine Lucas        Date: 3/6/2018  : 2000   YES Patient  Verified  Visit #:   1     Insurance: Payor:  / Plan: [place] AND DEPENDENTS / Product Type:  /      In time: 4:00 Out time: 5:00   Total Treatment Time: 60     Medicare Time Tracking (below)   Total Timed Codes (min):  na 1:1 Treatment Time:  na     TREATMENT AREA =  Left knee    SUBJECTIVE    Pain Level (on 0 to 10 scale):  0  / 10   Medication Changes/New allergies or changes in medical history, any new surgeries or procedures? NO    If yes, update Summary List   Subjective Functional Status/Changes:  []  No changes reported     See Eval          OBJECTIVE      10 min Therapeutic Exercise:  [x]  See flow sheet   Rationale:      increase ROM and increase strength to improve the patients ability to run with less pain      min Patient Education:  YES  Reviewed HEP   []  Progressed/Changed HEP based on:   Issued HEP     Other Objective/Functional Measures:    See Eval     Post Treatment Pain Level (on 0 to 10) scale:   0  / 10     ASSESSMENT    Assessment/Changes in Function:     Justification for Eval Code Complexity: Moderate  Patient History (low 0, mod 1-2, high 3-4):  Moderate-Hx left knee pain   Examination (low 1-2, mod 3+, high 4+): Decreased strength/balance/activity tolerance-moderate   Clinical Presentation (low: stable/uncomplicated; mod: evolving; high: unstable/unpredictable): low  Clinical Decision Making (low , mod 26-74, high 1-25): FOTO Moderate         []  See Progress Note/Recertification   Patient will continue to benefit from skilled PT services to analyze,, cue,, progress,, modify,, demonstrate,, instruct, and address, movement patterns,, therapeutic interventions,, postural abnormalities,, soft tissue restrictions,, ROM,, strength,, functional mobility,, body mechanics/ergonomics, and home and community integration, to attain remaining goals.    Progress toward goals / Updated goals:    Goals set per POC     PLAN    []  Upgrade activities as tolerated YES Continue plan of care   []  Discharge due to :    []  Other:      Therapist: Joselyn Montano DPT    Date: 3/6/2018 Time: 5:09 PM   Future Appointments  Date Time Provider Adolfo Shay   3/9/2018 7:30 AM Eli Patel [facility]   3/13/2018 3:00 PM Franca Melton PT [facility]   3/20/2018 3:00 PM Eli Patel PT [facility]   3/28/2018 2:30 PM Eli Patel, [facility]

## 2018-03-06 NOTE — PROGRESS NOTES
[address] [facility] PHYSICAL THERAPY  [address] [facility] [address], [place], [address] - Phone: [phone]  Fax: [phone] / [address] [facility]  Patient Name: Steven May : 2000   Medical   Diagnosis: Left knee pain [M25.562] Treatment Diagnosis: Left knee pes anserine bursitis, PFS   Onset Date: 2018     Referral Source: Migdalia Worthy DO Start of Care [facility]): 3/6/2018   Prior Hospitalization: See medical history Provider #: [id number]   Prior Level of Function: RTC, Rucking, Running, Recreational gym   Comorbidities: Hx of left knee pain   Medications: Verified on Patient Summary List   The Plan of Care and following information is based on the information from the initial evaluation.   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 She was able to continue and finish her run and self treated with ice however pain continued. Pt know reports pain only with running/sprinting/stair climbing. S/S consistent with Dx as pt is TTP over pes anserine. (-) Francis/Thessaly/Ligament testing today. Noted decreased left hip MMT abd at 4-/5 and add 4/5. Pt also with noted asymmetrical innominates which may have an effect on hip musculature length tension relationship. Trialed MET to correct today without success. Will plan to continue to monitor throughout sessions.  Pt would benefit from continued skilled PT to progress strength and stability for increased ease with return to running.   ==========================================================================================  Eval Complexity: History: MEDIUM  Complexity : 1-2 comorbidities / personal factors will impact the outcome/ POC Exam:MEDIUM Complexity : 3 Standardized tests and measures addressing body structure, function, activity limitation and / or participation in recreation  Presentation: LOW Complexity : Stable, uncomplicated  Clinical Decision Making:MEDIUM Complexity : FOTO score of 26-74Overall Complexity:LOW     Problem List: pain affecting function, decrease strength, impaired gait/ balance, decrease ADL/ functional abilitiies, decrease activity tolerance and decrease flexibility/ joint mobility   Treatment Plan may include any combination of the following: Therapeutic exercise, Therapeutic activities, Neuromuscular re-education, Physical agent/modality, Gait/balance training, Manual therapy, Aquatic therapy, Patient education, Self Care training, Functional mobility training, Home safety training, Stair training and Other: iontophoresis  Patient / Family readiness to learn indicated by: asking questions, trying to perform skills and interest  Persons(s) to be included in education: patient (P)  Barriers to Learning/Limitations: None  Measures taken: FS FOTO score 70 indicating 30% functional disability   Patient Goal (s): \"Run without pain\"   Patient self reported health status: excellent  Rehabilitation Potential: excellent   Short Term Goals: To be accomplished in  2  weeks:  1. Pt will be compliant with HEP for symptom management at home. 2. Pt will report >/= 50% improvement negt steps for increased ease on campus.  Long Term Goals: To be accomplished in  4  weeks:  1. Pt will be independent with HEP at D/C for self management. 2. Pt will report ability to run 1 mile without increased pain for ease with [employer]. 3. Pt will increase left hip abd MMT to >/= 4/5 for increased stability on unlevel surfaces.    Frequency / Duration:   Patient to be seen  1-2  times per week for 4  weeks:  Patient / Caregiver education and instruction: self care, activity modification and exercises  G-Codes (GP): NA  Therapist Signature: Max Costello PT Date: 9/7/9183   Certification Period: NA Time: 5:06 PM   ===========================================================================================  I certify that the above Physical Therapy Services are being furnished while the patient is under my care. I agree with the treatment plan and certify that this therapy is necessary. Physician Signature:        Date:       Time:     Please sign and return to [employer] or you may fax the signed copy to [phone]. Thank you.

## 2018-03-09 ENCOUNTER — HOSPITAL ENCOUNTER (OUTPATIENT)
Dept: PHYSICAL THERAPY | Age: 18
Discharge: HOME | End: 2018-03-09
Payer: OTHER GOVERNMENT

## 2018-03-09 PROCEDURE — 97033 APP MDLTY 1+IONTPHRSIS EA 15: CPT

## 2018-03-09 PROCEDURE — 97140 MANUAL THERAPY 1/> REGIONS: CPT

## 2018-03-09 PROCEDURE — 97110 THERAPEUTIC EXERCISES: CPT

## 2018-03-09 NOTE — PROGRESS NOTES
PHYSICAL THERAPY - DAILY TREATMENT NOTE    Patient Name: Anderson Mulligan        Date: 3/9/2018  : 2000   YES Patient  Verified  Visit #:   2   of   4-6  Insurance: Payor: [facility] / Plan: Flavia Zarate DEPENDENTS / Product Type: Maykel Yovany /      In time: 7:30 Out time: 8:15   Total Treatment Time: 45     TREATMENT AREA = Left knee pain [M25.562]    SUBJECTIVE    Pain Level (on 0 to 10 scale):  0  / 10   Medication Changes/New allergies or changes in medical history, any new surgeries or procedures? NO0    If yes, update Summary List   Subjective Functional Status/Changes:  []  No changes reported     \"When I walk down the steps I feel it on my knee and my (Right SIJ) back. When  Dr. Rad Lloyd, I had originally complained about my left shoulder too but he said it was a rib issue and popped it back in. But I noticed it yesterday it felt like it was coming back. \"          OBJECTIVE  Therapeutic Procedures:  Min Procedure Specifics + Rationale   n/a [x]  Patient Education (performed throughout session) [x] Review HEP    [] Progressed/Changed HEP based on:   [] proper performance and advancement of Therex/TA   [] reduction in pain level    [] increased functional capacity       [] change in directional preference   29 [x] Therapeutic Exercise   [x]  See Flowsheet   Rationale: increase ROM and increase strength to improve the patients ability to participate in ADL's    8 []  Manual Therapy [] STM/DTM     [] IASTM     [] Scar Massage  [] X-friction       [] PNF         [] PROM    [] TDN (see objective; actual needle insertion time not billed)   [] Soft tissue mobz   [] Joint mobz: Gr I [] II []  III [] IV[] V[]:  Right SIJ HVLAT in Lateral Recumbent with Lateral Flex/Ext/Rot * Body Drop  Prone extension mobilization on Right SIJ with Right Hip Extension mobilization  Left fibular anterior manipulation with flexion.    Rationale: decrease pain, increase ROM, increase tissue extensibility, decrease trigger points and increase postural awareness to attain functional use and participation with ADL's     Modality rationale: decrease inflammation, decrease pain, increase tissue extensibility and increase muscle contraction/control to improve the patients ability to perform ADL's with greater ease   Min Type Additional Details   8 [x]  Iontophoresis with dexamethasone                         [x] 40 mA             [] 80 mA [x] In clinic  [x] Take home patch   [] Skin assessment post-treatment:  []intact []redness- no adverse reaction       []redness  adverse reaction:     Other Objective/Functional Measures:    Patient educated on purpose of manipulations, neurophysiological effects, biochemical effects, and biomechanical effects in relation to her condition. She verbalized understanding. Patient assessed for appropriateness pre-HVLAT, after which consent to proceed was given. Therapist discussed post-manipulation effects and any adverse reactions (if appropriate) to determine further PT intervention through manipulation. Patient reported relief of pain with stair descent to Right L/S. Advised patient to trial standing repeated Right Hip Extension in standing. Educated patient on iontophoresis   Patient given Supine Scap Stab #1 to address Left shoulder discomfort. Post Treatment Pain Level (on 0 to 10) scale:   0  / 10     ASSESSMENT    Assessment/Changes in Function:     Difficulty attaining stretch to adductors with belt.   []  See Progress Note/ Recertification  []  See Discharge Summary        Patient will continue to benefit from skilled PT services to modify and progress therapeutic interventions, address functional mobility deficits, address ROM deficits, address strength deficits, analyze and address soft tissue restrictions, analyze and cue movement patterns, analyze and modify body mechanics/ergonomics and instruct in home and community integration  to attain remaining goals   Progress toward goals / Updated goals:    1st session since initial eval, no significant progress noted. PLAN    [x]  Upgrade activities as tolerated  [x]  Update interventions per flow sheet YES Continue plan of care   []  Discharge due to :    []  Other:      Therapist: Messi Hdez \"BJ\" BERNARDINO Eubanks, Cert. MDT, Cert. DN, Cert.  SMT    Date: 3/9/2018 Time: 7:38 AM   Future Appointments  Date Time Provider Adolfo Shay   3/13/2018 3:00 PM Venkatesh Sotelo, PT [facility]   3/20/2018 3:00 PM Dayanara Shine PT [facility]   3/28/2018 2:30 PM Dayanara Shine PT [facility]

## 2018-03-13 ENCOUNTER — HOSPITAL ENCOUNTER (OUTPATIENT)
Dept: PHYSICAL THERAPY | Age: 18
Discharge: HOME | End: 2018-03-13
Payer: OTHER GOVERNMENT

## 2018-03-13 PROCEDURE — 97110 THERAPEUTIC EXERCISES: CPT

## 2018-03-13 NOTE — PROGRESS NOTES
PHYSICAL THERAPY - DAILY TREATMENT NOTE    Patient Name: Francisco Salinas        Date: 3/13/2018  : 2000   YES Patient  Verified  Visit #:   3   of   8  Insurance: Payor:  / Plan: [facility]  RETIREES AND DEPENDENTS / Product Type:  /      In time: 3:00 Out time: 3:30   Total Treatment Time: 30     Medicare Time Tracking (below)   Total Timed Codes (min):  na 1:1 Treatment Time:  na     TREATMENT AREA =  Left knee    SUBJECTIVE    Pain Level (on 0 to 10 scale):  0  / 10   Medication Changes/New allergies or changes in medical history, any new surgeries or procedures? NO    If yes, update Summary List   Subjective Functional Status/Changes:  []  No changes reported     \"I didn't have pain on stairs so that's an improvement. I have an 8 mile ruck tomorrow\"          OBJECTIVE      30 min Therapeutic Exercise:  [x]  See flow sheet   Rationale:      increase ROM, increase strength and kinesthetic sense to improve the patients ability to run without pain      min Patient Education:  YES  Reviewed HEP   []  Progressed/Changed HEP based on:   Updated HEP     Other Objective/Functional Measures:    Instructed to perform butt burners prior to ruck tomorrow  VC for symmetrical WBing during squats and nuetral hip and knee   Reported right LBP with left half moon, changes to at stair without complaints    Post Treatment Pain Level (on 0 to 10) scale:   0  / 10     ASSESSMENT    Assessment/Changes in Function:     Good kinesthetic sense following VC     []  See Progress Note/Recertification   Patient will continue to benefit from skilled PT services to analyze,, cue,, progress,, modify,, demonstrate,, instruct, and address, movement patterns,, therapeutic interventions,, postural abnormalities,, soft tissue restrictions,, ROM,, strength,, functional mobility,, body mechanics/ergonomics, and home and community integration, to attain remaining goals.    Progress toward goals / Updated goals:    Reviewed HEP     PLAN    []  Upgrade activities as tolerated YES Continue plan of care   []  Discharge due to :    []  Other:      Therapist: Fly Carranza DPT    Date: 3/13/2018 Time: 3:46 PM   Future Appointments  Date Time Provider Adolfo Shay   3/20/2018 3:00 PM Joanna Vivar PT [facility]   3/28/2018 2:30 PM Joanna Vivar PT [facility]

## 2018-03-20 ENCOUNTER — APPOINTMENT (OUTPATIENT)
Dept: PHYSICAL THERAPY | Age: 18
End: 2018-03-20
Payer: OTHER GOVERNMENT

## 2018-05-18 ENCOUNTER — OFFICE VISIT (OUTPATIENT)
Dept: FAMILY MEDICINE CLINIC | Age: 18
End: 2018-05-18

## 2018-05-18 VITALS
HEART RATE: 74 BPM | RESPIRATION RATE: 18 BRPM | BODY MASS INDEX: 27 KG/M2 | HEIGHT: 66 IN | WEIGHT: 168 LBS | SYSTOLIC BLOOD PRESSURE: 110 MMHG | OXYGEN SATURATION: 97 % | TEMPERATURE: 98.2 F | DIASTOLIC BLOOD PRESSURE: 62 MMHG

## 2018-05-18 DIAGNOSIS — L30.9 ECZEMA, UNSPECIFIED TYPE: Primary | ICD-10-CM

## 2018-05-18 DIAGNOSIS — L70.0 ACNE VULGARIS: ICD-10-CM

## 2018-05-18 DIAGNOSIS — J30.1 SEASONAL ALLERGIC RHINITIS DUE TO POLLEN: ICD-10-CM

## 2018-05-18 DIAGNOSIS — R06.2 WHEEZING: ICD-10-CM

## 2018-05-18 RX ORDER — DESONIDE 0.5 MG/G
CREAM TOPICAL 2 TIMES DAILY
Qty: 60 G | Refills: 0 | Status: SHIPPED | OUTPATIENT
Start: 2018-05-18 | End: 2018-06-15

## 2018-05-18 RX ORDER — ALBUTEROL SULFATE 90 UG/1
2 AEROSOL, METERED RESPIRATORY (INHALATION)
Qty: 3 INHALER | Refills: 1 | Status: SHIPPED | OUTPATIENT
Start: 2018-05-18

## 2018-05-18 RX ORDER — CETIRIZINE HCL 10 MG
10 TABLET ORAL
Qty: 90 TAB | Refills: 1 | Status: SHIPPED | OUTPATIENT
Start: 2018-05-18 | End: 2020-03-23 | Stop reason: ALTCHOICE

## 2018-05-18 RX ORDER — FLUTICASONE PROPIONATE 50 MCG
2 SPRAY, SUSPENSION (ML) NASAL DAILY
Qty: 3 BOTTLE | Refills: 3 | Status: SHIPPED | OUTPATIENT
Start: 2018-05-18

## 2018-05-18 RX ORDER — LEVONORGESTREL AND ETHINYL ESTRADIOL 0.1-0.02MG
1 KIT ORAL DAILY
Qty: 3 PACKAGE | Refills: 0 | Status: SHIPPED | OUTPATIENT
Start: 2018-05-18 | End: 2018-09-01 | Stop reason: SDUPTHER

## 2018-05-18 NOTE — PROGRESS NOTES
Assessment/Plan:    Diagnoses and all orders for this visit:    1. Eczema, unspecified type  -     desonide (TRIDESILON) 0.05 % cream; Apply  to affected area two (2) times a day. Apply pea-sized amount bid x 7 days prn eczema    2. Seasonal allergic rhinitis due to pollen- add flonase for uncontrolled sx.  -     cetirizine (ZYRTEC) 10 mg tablet; Take 1 Tab by mouth daily as needed for Allergies. -     fluticasone (FLONASE) 50 mcg/actuation nasal spray; 2 Sprays by Both Nostrils route daily. 3. Acne vulgaris-refilled, but advised to f/u with gyn  -     levonorgestrel-ethinyl estradiol (ORSYTHIA) 0.1-20 mg-mcg tab; Take 1 Tab by mouth daily. 4. Wheezing- RAD vs asthma. No significant dyspnea. May improve with better control of allergies. Albuterol prn  -     albuterol (PROVENTIL HFA, VENTOLIN HFA, PROAIR HFA) 90 mcg/actuation inhaler; Take 2 Puffs by inhalation every four (4) hours as needed for Wheezing. Bring in vaccine records  The plan was discussed with the patient. The patient verbalized understanding and is in agreement with the plan. All medication potential side effects were discussed with the patient. Health Maintenance:   Health Maintenance   Topic Date Due    Hepatitis A Peds Age 1-18 (1 of 2 - Standard Series) 04/26/1999    DTaP/Tdap/Td series (1 - Tdap) 04/26/2005    HPV Age 9Y-34Y (3 of 3 - Female 3 Dose Series) 04/26/2009    Influenza Age 5 to Adult  08/01/2018       Thalia Plasencia is a 19 y.o.  female and presents with Establish Care     Subjective:  Pt is here to establish care. Is going to [place] as part of [address] [facility]. Is up to date with vaccine. ROS:  Constitutional: No recent weight change. No weakness/fatigue. No f/c. Skin: No rashes, change in nails/hair, itching   HENT: No HA, dizziness. No hearing loss/tinnitus. + nasal congestion/discharge. Eyes: No change in vision, double/blurred vision or eye pain/redness. Cardiovascular: No CP/palpitations.   No SAUNDERS/orthopnea/PND. Respiratory: No cough/sputum, dyspnea, wheezing. Gastointestinal: No dysphagia, reflux. No n/v. No constipation/diarrhea. No melena/rectal bleeding. Genitourinary: No dysuria, urinary hesitancy, nocturia, hematuria. No incontinence. Musculoskeletal: No joint pain/stiffness. No muscle pain/tenderness. Endo: No heat/cold intolerance, no polyuria/polydypsia. Heme: No h/o anemia. No easy bleeding/bruising. Allergy/Immunology: + seasonal rhinitis. Denies frequent colds, sinus/ear infections. Neurological: No seizures/numbness/weakness. No paresthesias. Psychiatric:  No depression, anxiety. PMH:  No past medical history on file. PSH:  No past surgical history on file. SH:  Social History   Substance Use Topics    Smoking status: Never Smoker    Smokeless tobacco: Never Used    Alcohol use Yes      Comment: occ       FH:  Family History   Problem Relation Age of Onset    Cancer Mother     Thyroid Disease Father        Medications/Allergies:    Current Outpatient Prescriptions:     cetirizine (ZYRTEC) 10 mg tablet, Take  by mouth., Disp: , Rfl:     levonorgestrel-ethinyl estradiol (ORSYTHIA) 0.1-20 mg-mcg tab, Take  by mouth., Disp: , Rfl:   No Known Allergies    Objective:  Visit Vitals    /62 (BP 1 Location: Right arm, BP Patient Position: Sitting)    Pulse 74    Temp 98.2 °F (36.8 °C) (Oral)    Resp 18    Ht 5' 6\" (1.676 m)    Wt 168 lb (76.2 kg)    LMP 04/29/2018    SpO2 97%    BMI 27.12 kg/m2      Constitutional: Well developed, nourished, no distress, alert   HENT: Exterior ears and tympanic membranes normal bilaterally. Supple neck. No thyromegaly or lymphadenopathy. Oropharynx clear and moist mucous membranes. +hypertrophy of inferior turbinates and deviated septum   Eyes: Conjunctiva normal. PERRL. Cardiovascular: S1, S2.  RRR. No murmurs/rubs. No thrills palpated. No carotid bruits. Intact distal pulses. No edema.    Pulmonary/Chest Wall: No abnormalities on inspection. Minimal wheeze YANY. Normal effort. GI: Soft, nontender, nondistended. Normal active bowel sounds. No  masses on palpation. No hepatosplenomegaly. Musculoskeletal: Gait normal.  Joints without deformity/tenderness. Neurological: Appropriate. No focal motor or sensory deficits. Speech normal.   Skin: No lesions/rashes on inspection. Psych: Appropriate affect, judgement and insight. Short-term memory intact.

## 2018-05-18 NOTE — PROGRESS NOTES
Angel Hendrix is a 19 y.o. female (: 2000) presenting to address:    Chief Complaint   Patient presents with    Establish Care       Vitals:    18 1036   BP: 110/62   Pulse: 74   Resp: 18   Temp: 98.2 °F (36.8 °C)   TempSrc: Oral   SpO2: 97%   Weight: 168 lb (76.2 kg)   Height: 5' 6\" (1.676 m)   PainSc:   0 - No pain   LMP: 2018       Hearing/Vision:      Visual Acuity Screening    Right eye Left eye Both eyes   Without correction: 20/15 20/15 20/15   With correction:          Learning Assessment:     Learning Assessment 2018   PRIMARY LEARNER Patient   HIGHEST LEVEL OF EDUCATION - PRIMARY LEARNER  2 YEARS OF COLLEGE   BARRIERS PRIMARY LEARNER NONE   CO-LEARNER CAREGIVER No   PRIMARY LANGUAGE ENGLISH    NEED No   LEARNER PREFERENCE PRIMARY READING   LEARNING SPECIAL TOPICS none   ANSWERED BY patient   RELATIONSHIP SELF   ASSESSMENT COMMENT n/a     Depression Screening:     PHQ over the last two weeks 2018   Little interest or pleasure in doing things Not at all   Feeling down, depressed or hopeless Not at all   Total Score PHQ 2 0     Fall Risk Assessment:     Fall Risk Assessment, last 12 mths 2018   Able to walk? Yes   Fall in past 12 months? No     Abuse Screening:     Abuse Screening Questionnaire 2018   Do you ever feel afraid of your partner? N   Are you in a relationship with someone who physically or mentally threatens you? N   Is it safe for you to go home? Y       Advanced Directive:   1. Do you have an Advanced Directive? NO    2. Would you like information on Advanced Directives?  YES

## 2018-05-18 NOTE — MR AVS SNAPSHOT
[address] [place] [address] [address] [facility] [place] [address] [phone] Patient: Caroline Storey MRN: [id number] :2000 Visit Information Date & Time Provider Department Dept. Phone Encounter #  
 2018 10:45 AM Jordan Helmedix [phone] [id number] Upcoming Health Maintenance Date Due Hepatitis A Peds Age 1-18 (1 of 2 - Standard Series) 1999 DTaP/Tdap/Td series (1 - Tdap) 2005 HPV Age 9Y-34Y (1 of 3 - Female 3 Dose Series) 2009 Influenza Age 5 to Adult 2018 Allergies as of 2018  Review Complete On: 2018 By: Hema Adams No Known Allergies Current Immunizations  Never Reviewed No immunizations on file. Not reviewed this visit You Were Diagnosed With   
  
 Codes Comments Eczema, unspecified type    -  Primary ICD-10-CM: L30.9 ICD-9-CM: 692.9 Seasonal allergic rhinitis due to pollen     ICD-10-CM: J30.1 ICD-9-CM: 477.0 Acne vulgaris     ICD-10-CM: L70.0 ICD-9-CM: 706.1 Wheezing     ICD-10-CM: R06.2 ICD-9-CM: 786.07 Vitals BP Pulse Temp Resp Height(growth percentile) Weight(growth percentile) 110/62 (BP 1 Location: Right arm, BP Patient Position: Sitting) 74 98.2 °F (36.8 °C) (Oral) 18 5' 6\" (1.676 m) 168 lb (76.2 kg) LMP SpO2 BMI OB Status Smoking Status 2018 97% 27.12 kg/m2 Having regular periods Never Smoker Vitals History BMI and BSA Data Body Mass Index Body Surface Area  
 27.12 kg/m 2 1.88 m 2 Preferred Pharmacy Pharmacy Name Phone [facility], [address] [place] [address] Drive [phone] Your Updated Medication List  
  
   
This list is accurate as of 18 11:02 AM.  Always use your most recent med list.  
  
  
  
  
 albuterol 90 mcg/actuation inhaler Commonly known as:  PROVENTIL HFA, VENTOLIN HFA, PROAIR HFA  
 Take 2 Puffs by inhalation every four (4) hours as needed for Wheezing. cetirizine 10 mg tablet Commonly known as:  ZyrTEC Take 1 Tab by mouth daily as needed for Allergies. desonide 0.05 % cream  
Commonly known as:  Terrye Oiler Apply  to affected area two (2) times a day. Apply pea-sized amount bid x 7 days prn eczema  
  
 fluticasone 50 mcg/actuation nasal spray Commonly known as:  Cyndra Puna 2 Sprays by Both Nostrils route daily. levonorgestrel-ethinyl estradiol 0.1-20 mg-mcg Tab Commonly known as:  Rodolph Abts Take 1 Tab by mouth daily. Prescriptions Printed Refills  
 desonide (TRIDESILON) 0.05 % cream 0 Sig: Apply  to affected area two (2) times a day. Apply pea-sized amount bid x 7 days prn eczema Class: Print Route: Topical  
 cetirizine (ZYRTEC) 10 mg tablet 1 Sig: Take 1 Tab by mouth daily as needed for Allergies. Class: Print Route: Oral  
 levonorgestrel-ethinyl estradiol (ORSYTHIA) 0.1-20 mg-mcg tab 0 Sig: Take 1 Tab by mouth daily. Class: Print Route: Oral  
 fluticasone (FLONASE) 50 mcg/actuation nasal spray 3 Si Sprays by Both Nostrils route daily. Class: Print Route: Both Nostrils  
 albuterol (PROVENTIL HFA, VENTOLIN HFA, PROAIR HFA) 90 mcg/actuation inhaler 1 Sig: Take 2 Puffs by inhalation every four (4) hours as needed for Wheezing. Class: Print Route: Inhalation Introducing [facility]! Brian Rosa introduces [employer] patient portal. Now you can access parts of your medical record, email your doctor's office, and request medication refills online. 1. In your internet browser, go to [email or web address] 2. Click on the First Time User? Click Here link in the Sign In box. You will see the New Member Sign Up page. 3. Enter your [employer] Access Code exactly as it appears below. You will not need to use this code after youve completed the sign-up process.  If you do not sign up before the expiration date, you must request a new code. · [employer] Access Code: [email or web address] Expires: 5/23/2018  9:05 AM 
 
4. Enter the last four digits of your Social Security Number (xxxx) and Date of Birth (mm/dd/yyyy) as indicated and click Submit. You will be taken to the next sign-up page. 5. Create a [employer] ID. This will be your [employer] login ID and cannot be changed, so think of one that is secure and easy to remember. 6. Create a [employer] password. You can change your password at any time. 7. Enter your Password Reset Question and Answer. This can be used at a later time if you forget your password. 8. Enter your e-mail address. You will receive e-mail notification when new information is available in [address] 9. Click Sign Up. You can now view and download portions of your medical record. 10. Click the Download Summary menu link to download a portable copy of your medical information. If you have questions, please visit the Frequently Asked Questions section of the [employer] website. Remember, [employer] is NOT to be used for urgent needs. For medical emergencies, dial 911. Now available from your iPhone and Android! Please provide this summary of care documentation to your next provider. Your primary care clinician is listed as Santhosh 13. If you have any questions after today's visit, please call [phone].

## 2018-06-07 NOTE — PROGRESS NOTES
[address] [facility] PHYSICAL THERAPY  [address] [facility] [place], [address] - Phone: [phone]  Fax: [phone]  DISCHARGE SUMMARY FOR PHYSICAL THERAPY          Patient Name: John Black : 2000   Treatment/Medical Diagnosis: Left knee pain [M25.562]   Onset Date: 2018    Referral Source: Chava Ledbetter DO Start of Care [facility]): 3/6/18   Prior Hospitalization: NA Provider #: [id number]   Prior Level of Function: RTC, Rucking, Running, Recreational gym   Comorbidities: Hx of left knee pain   Medications: Verified on Patient Summary List   Vis[facility] from [facility]: 3 Missed Visits: 1     Key Functional Changes/Progress: Pt was seen for initial evaluation and x2 follow-ups with use of ionto x1. Pt did not return to PT following session on 3/13/18. Spoke with pt on 18 regarding continued PT and pt reported she would like to be D/C'd at this time as Ami Bustamante has had no pain >1 month\" with full activity. Will plan DC at this time per pt request.  Thank you for this referral!     G-Codes (GP): NA  Assessments/Recommendations: Discontinue therapy. Progressing towards or have reached established goals. If you have any questions/comments please contact us directly at [phone]. Thank you for allowing us to assist in the care of your patient. Therapist Signature: Sailaja Garay DPT Date: 2018   Reporting Period: NA Time: 59:05 PM      Certification Period: NA       NOTE TO PHYSICIAN:  PLEASE COMPLETE THE ORDERS BELOW AND FAX TO   [facility] Physical Therapy: [phone]. If you are unable to process this request in 24 hours please contact our office: [phone].    ___ I have read the above report and request that my patient be discharged from therapy.      Physician Signature:        Date:       Time:

## 2018-08-21 ENCOUNTER — OFFICE VISIT (OUTPATIENT)
Dept: FAMILY MEDICINE CLINIC | Age: 18
End: 2018-08-21

## 2018-08-21 ENCOUNTER — HOSPITAL ENCOUNTER (OUTPATIENT)
Dept: LAB | Age: 18
Discharge: HOME | End: 2018-08-21
Payer: OTHER GOVERNMENT

## 2018-08-21 VITALS
RESPIRATION RATE: 20 BRPM | TEMPERATURE: 98.2 F | WEIGHT: 159 LBS | HEART RATE: 73 BPM | DIASTOLIC BLOOD PRESSURE: 60 MMHG | HEIGHT: 67 IN | SYSTOLIC BLOOD PRESSURE: 100 MMHG | OXYGEN SATURATION: 99 % | BODY MASS INDEX: 24.96 KG/M2

## 2018-08-21 DIAGNOSIS — R40.0 DAYTIME SOMNOLENCE: ICD-10-CM

## 2018-08-21 DIAGNOSIS — K92.1 HEMATOCHEZIA: ICD-10-CM

## 2018-08-21 DIAGNOSIS — D69.6 THROMBOCYTOPENIA (HCC): Primary | ICD-10-CM

## 2018-08-21 DIAGNOSIS — R06.2 WHEEZING: ICD-10-CM

## 2018-08-21 DIAGNOSIS — D69.6 THROMBOCYTOPENIA (HCC): ICD-10-CM

## 2018-08-21 LAB
BASOPHILS # BLD: 0 K/UL (ref 0–0.1)
BASOPHILS NFR BLD: 0 % (ref 0–3)
DIFFERENTIAL METHOD BLD: ABNORMAL
EOSINOPHIL # BLD: 0 K/UL (ref 0–0.4)
EOSINOPHIL NFR BLD: 0 % (ref 0–5)
ERYTHROCYTE [DISTWIDTH] IN BLOOD BY AUTOMATED COUNT: 14.1 % (ref 11.6–14.5)
HCT VFR BLD AUTO: 37.6 % (ref 35–45)
HGB BLD-MCNC: 12.4 G/DL (ref 12–16)
LYMPHOCYTES # BLD: 1.4 K/UL (ref 0.8–3.5)
LYMPHOCYTES NFR BLD: 12 % (ref 20–51)
MCH RBC QN AUTO: 30.4 PG (ref 24–34)
MCHC RBC AUTO-ENTMCNC: 33 G/DL (ref 31–37)
MCV RBC AUTO: 92.2 FL (ref 74–97)
MONOCYTES # BLD: 1 K/UL (ref 0–1)
MONOCYTES NFR BLD: 8 % (ref 2–9)
NEUTS SEG # BLD: 9.6 K/UL (ref 1.8–8)
NEUTS SEG NFR BLD: 80 % (ref 42–75)
PLATELET # BLD AUTO: 141 K/UL (ref 135–420)
PLATELET COMMENTS,PCOM: ABNORMAL
PMV BLD AUTO: 10.8 FL (ref 9.2–11.8)
RBC # BLD AUTO: 4.08 M/UL (ref 4.2–5.3)
RBC MORPH BLD: ABNORMAL
WBC # BLD AUTO: 12 K/UL (ref 4.6–13.2)

## 2018-08-21 PROCEDURE — 36415 COLL VENOUS BLD VENIPUNCTURE: CPT | Performed by: INTERNAL MEDICINE

## 2018-08-21 PROCEDURE — 85025 COMPLETE CBC W/AUTO DIFF WBC: CPT | Performed by: INTERNAL MEDICINE

## 2018-08-21 NOTE — MR AVS SNAPSHOT
Caesar Donohue 
 
 
 [address] [facility] [address] [phone] Patient: Patricia Solorio MRN: [id number] :2000 Visit Information Date & Time Provider Department Dept. Phone Encounter #  
 2018 10:00 AM Neha Del Cid, Jose Frias [place] [phone] [id number] Upcoming Health Maintenance Date Due Hepatitis A Peds Age 1-18 (1 of 2 - Standard Series) 1999 DTaP/Tdap/Td series (1 - Tdap) 2005 HPV Age 9Y-34Y (1 of 3 - Female 3 Dose Series) 2009 Influenza Age 5 to Adult 2018 Allergies as of 2018  Review Complete On: 2018 By: Neha Del Cid MD  
 No Known Allergies Current Immunizations  Never Reviewed No immunizations on file. Not reviewed this visit You Were Diagnosed With   
  
 Codes Comments Thrombocyt[facility]    -  Primary ICD-10-CM: D69.6 ICD-9-CM: 287.5 Hematochezia     ICD-10-CM: K92.1 ICD-9-CM: 578.1 Wheezing     ICD-10-CM: R06.2 ICD-9-CM: 786.07 Daytime somnolence     ICD-10-CM: R40.0 ICD-9-CM: 780.54 Vitals BP Pulse Temp Resp Height(growth percentile) Weight(growth percentile) 100/60 (BP 1 Location: Right arm, BP Patient Position: Sitting) 73 98.2 °F (36.8 °C) (Oral) 20 5' 7\" (1.702 m) 159 lb (72.1 kg) LMP SpO2 BMI OB Status Smoking Status 2018 99% 24.9 kg/m2 Having regular periods Never Smoker Vitals History BMI and BSA Data Body Mass Index Body Surface Area 24.9 kg/m 2 1.85 m 2 Preferred Pharmacy Pharmacy Name Phone [address] [place] [address], [address] [facility]. [phone] Your Updated Medication List  
  
   
This list is accurate as of 18 10:23 AM.  Always use your most recent med list.  
  
  
  
  
 albuterol 90 mcg/actuation inhaler Commonly known as:  PROVENTIL HFA, VENTOLIN HFA, PROAIR HFA  
 Take 2 Puffs by inhalation every four (4) hours as needed for Wheezing. cetirizine 10 mg tablet Commonly known as:  ZyrTEC Take 1 Tab by mouth daily as needed for Allergies. fluticasone 50 mcg/actuation nasal spray Commonly known as:  Montine [place] 2 Sprays by Both Nostrils route daily. levonorgestrel-ethinyl estradiol 0.1-20 mg-mcg Tab Commonly known as:  Charles Hightowertt Take 1 Tab by mouth daily. SINGULAIR 10 mg tablet Generic drug:  montelukast  
Take 10 mg by mouth daily. Indications: Allergic Rhinitis We Performed the Following SLEEP MEDICINE REFERRAL [NVR018 Custom] To-Do List   
 08/21/2018 Lab:  CBC WITH AUTOMATED DIFF   
  
 08/21/2018 Lab:  PERIPHERAL SMEAR Around 08/21/2018 PFT:  PULMONARY FUNCTION TEST Referral Information Referral ID Referred By Referred To  
  
 [id number] Cora Ponce MD   
   [address] [place] [address] [place] [address] Phone: [phone] Fax: [phone] Visits Status Start Date End Date 1 New Request 8/21/18 8/21/19 If your referral has a status of pending review or denied, additional information will be sent to support the outcome of this decision. Introducing [facility]! Dear Precious Colvin: Thank you for requesting a [employer] account. Our records indicate that you already have an active [employer] account. You can access your account anytime at [email or web address] Did you know that you can access your hospital and ER discharge instructions at any time in [employer]io? You can also review all of your test results from your hospital stay or ER visit. Additional Information If you have questions, please visit the Frequently Asked Questions section of the [employer] website at [email or web address] Remember, [employer] is NOT to be used for urgent needs. For medical emergencies, dial 911. Now available from your iPhone and Android! Please provide this summary of care documentation to your next provider. Your primary care clinician is listed as Santhosh Hagen. If you have any questions after today's visit, please call [phone].

## 2018-08-21 NOTE — PATIENT INSTRUCTIONS
You have been referred to gynecology. Please call one of the preferred providers listed below and schedule your appointment. Once you have scheduled your appointment, please call the office at [phone] and leave the details of your appointment (provider you will be seeing, appointment date and time) on the voice mail.       Kennedy Cobb  [address], [address], [address] [facility],   [address]  phone: [phone] [facility]  [address], [address], [address] [place] [address]  [phone]

## 2018-08-21 NOTE — PROGRESS NOTES
Chief Complaint   Patient presents with   Jeramy Ponce ED Follow-up     Blood in stool       ASSESSMENT/PLAN  1. Thrombocytopenia (HCC)  -likely clumping. Ck cbc in EDTA-free tube and do smear  - CBC WITH AUTOMATED DIFF; Future  - PERIPHERAL SMEAR - SUNQUEST ONLY; Future    2. Hematochezia  -resolved. RTO if sx return    3. Wheezing  -suspect asthma, do PFT  - PULMONARY FUNCTION TEST; Future    4. Daytime somnolence  - SLEEP MEDICINE REFERRAL    Push fluids, may use Pyridium OTC prn. Call or return to clinic prn if these symptoms worsen or fail to improve as anticipated. The plan was discussed with the patient. The patient verbalized understanding and is in agreement with the plan. All medication potential side effects were discussed with the patient. SUBJECTIVE: Katt Dewitt is a [address] y.o. female just returned from [place] for [employer]. She states she's been having fecal incontinence/leakage and BRBPR x 3 weeks (now resolved). Was seen in ER for this - stool studies were ordered, but she never did them. Was seen in ER 6/2 and CT showed phlebolith vs R ureteral stone w/o hydronephrosis. U/a showed RBC, protein and LE. Was treated with antibiotics for this episode. Was also noted to have low platelets. Has family h/o low platelets, but no bleeding. Denies easy bruising and bleeding. She notes daytime somnolence. Getting plenty of sleep. Has difficulty staying awake in class. occ will fall asleep while at red lights/driving. She's been having intermittent wheezing at night. Albuterol helps. OBJECTIVE:   Visit Vitals    /60 (BP 1 Location: Right arm, BP Patient Position: Sitting)    Pulse 73    Temp 98.2 °F (36.8 °C) (Oral)    Resp 20    Ht 5' 7\" (1.702 m)    Wt 159 lb (72.1 kg)    LMP 07/22/2018    SpO2 99%    BMI 24.9 kg/m2       Gen: Appears well, in no apparent distress. CV: RRR  Pulm: CTA bilaterally. No wheezes/rales/crackles.             Pato Dang MD

## 2018-08-21 NOTE — PROGRESS NOTES
Annette Simmonds is a 19 y.o. female (: 2000) presenting to address:    Chief Complaint   Patient presents with   [facility] ED Follow-up     Blood in stool       Vitals:    18 1007   BP: 100/60   Pulse: 73   Resp: 20   Temp: 98.2 °F (36.8 °C)   TempSrc: Oral   SpO2: 99%   Weight: 159 lb (72.1 kg)   Height: 5' 7\" (1.702 m)   PainSc:   0 - No pain   LMP: 2018       Learning Assessment:     Learning Assessment 2018   PRIMARY LEARNER Patient   HIGHEST LEVEL OF EDUCATION - PRIMARY LEARNER  2 YEARS OF COLLEGE   BARRIERS PRIMARY LEARNER NONE   CO-LEARNER CAREGIVER No   PRIMARY LANGUAGE ENGLISH    NEED No   LEARNER PREFERENCE PRIMARY READING   LEARNING SPECIAL TOPICS none   ANSWERED BY patient   RELATIONSHIP SELF   ASSESSMENT COMMENT n/a     Depression Screening:     PHQ over the last two weeks 2018   Little interest or pleasure in doing things Not at all   Feeling down, depressed, irritable, or hopeless Not at all   Total Score PHQ 2 0     Fall Risk Assessment:     Fall Risk Assessment, last 12 mths 2018   Able to walk? Yes   Fall in past 12 months? No     Abuse Screening:     Abuse Screening Questionnaire 2018   Do you ever feel afraid of your partner? N   Are you in a relationship with someone who physically or mentally threatens you? N   Is it safe for you to go home? Y     Coordination of Care Questionaire:   1. Have you been to the ER, urgent care clinic since your last visit? Hospitalized since your last visit? [address] [place] [address]    2. Have you seen or consulted any other health care providers outside of the [address] [place] [address] since your last visit? Include any pap smears or colon screening. NO    Advanced Directive:   1. Do you have an Advanced Directive? YES    2. Would you like information on Advanced Directives?  NO

## 2018-08-22 LAB — PERIPHERAL SMEAR,PSM: NORMAL

## 2018-09-01 DIAGNOSIS — L70.0 ACNE VULGARIS: ICD-10-CM

## 2018-09-04 RX ORDER — LEVONORGESTREL AND ETHINYL ESTRADIOL 0.1-0.02MG
1 KIT ORAL DAILY
Qty: 3 PACKAGE | Refills: 3 | Status: SHIPPED | OUTPATIENT
Start: 2018-09-04 | End: 2018-11-16 | Stop reason: SDUPTHER

## 2018-09-04 NOTE — TELEPHONE ENCOUNTER
From: Delores Bearden  To: Romana Kenney MD  Sent: 9/1/2018 4:32 PM EDT  Subject: Medication Renewal Request    Original authorizing provider: MD Delores Carrillo would like a refill of the following medications:  levonorgestrel-ethinyl estradiol (Claretta Rook) 0.1-20 mg-mcg tab Romana Kenney MD]    Preferred pharmacy: [address]:

## 2018-11-02 DIAGNOSIS — N89.8 VAGINAL LESION: Primary | ICD-10-CM

## 2018-11-12 ENCOUNTER — OFFICE VISIT (OUTPATIENT)
Dept: FAMILY MEDICINE CLINIC | Age: 18
End: 2018-11-12

## 2018-11-12 ENCOUNTER — HOSPITAL ENCOUNTER (OUTPATIENT)
Dept: LAB | Age: 18
Discharge: HOME | End: 2018-11-12
Payer: OTHER GOVERNMENT

## 2018-11-12 VITALS
HEIGHT: 67 IN | WEIGHT: 174 LBS | BODY MASS INDEX: 27.31 KG/M2 | SYSTOLIC BLOOD PRESSURE: 110 MMHG | RESPIRATION RATE: 19 BRPM | HEART RATE: 73 BPM | OXYGEN SATURATION: 99 % | DIASTOLIC BLOOD PRESSURE: 78 MMHG | TEMPERATURE: 97.8 F

## 2018-11-12 DIAGNOSIS — Z11.3 SCREEN FOR STD (SEXUALLY TRANSMITTED DISEASE): Primary | ICD-10-CM

## 2018-11-12 DIAGNOSIS — Z01.419 WELL FEMALE EXAM WITH ROUTINE GYNECOLOGICAL EXAM: ICD-10-CM

## 2018-11-12 DIAGNOSIS — Z11.3 SCREEN FOR STD (SEXUALLY TRANSMITTED DISEASE): ICD-10-CM

## 2018-11-12 PROCEDURE — 88175 CYTOPATH C/V AUTO FLUID REDO: CPT

## 2018-11-12 PROCEDURE — 87529 HSV DNA AMP PROBE: CPT

## 2018-11-12 NOTE — PROGRESS NOTES
Andrew Rodgers is a 19 y.o. female (: 2000) presenting to address:    Chief Complaint   Patient presents with    Well Woman       Vitals:    18 1322   BP: 110/78   Pulse: 73   Resp: 19   Temp: 97.8 °F (36.6 °C)   TempSrc: Oral   SpO2: 99%   Weight: 174 lb (78.9 kg)   Height: 5' 7\" (1.702 m)   PainSc:   0 - No pain       Learning Assessment:     Learning Assessment 2018   PRIMARY LEARNER Patient   HIGHEST LEVEL OF EDUCATION - PRIMARY LEARNER  2 YEARS OF COLLEGE   BARRIERS PRIMARY LEARNER NONE   CO-LEARNER CAREGIVER No   PRIMARY LANGUAGE ENGLISH    NEED No   LEARNER PREFERENCE PRIMARY READING   LEARNING SPECIAL TOPICS none   ANSWERED BY patient   RELATIONSHIP SELF   ASSESSMENT COMMENT n/a     Depression Screening:     PHQ over the last two weeks 2018   Little interest or pleasure in doing things Not at all   Feeling down, depressed, irritable, or hopeless Not at all   Total Score PHQ 2 0     Fall Risk Assessment:     Fall Risk Assessment, last 12 mths 2018   Able to walk? Yes   Fall in past 12 months? No     Abuse Screening:     Abuse Screening Questionnaire 2018   Do you ever feel afraid of your partner? N   Are you in a relationship with someone who physically or mentally threatens you? N   Is it safe for you to go home? Y     Coordination of Care Questionaire:   1. Have you been to the ER, urgent care clinic since your last visit? Hospitalized since your last visit? NO    2. Have you seen or consulted any other health care providers outside of the [address] [place] [address] since your last visit? Include any pap smears or colon screening. NO    Advanced Directive:   1. Do you have an Advanced Directive? YES    2. Would you like information on Advanced Directives?  NO

## 2018-11-12 NOTE — PROGRESS NOTES
SUBJECTIVE:   19 y.o. female for annual routine checkup. HPI:  Wants HPV and HSV testing bc she has bumps external genitalia- don't itch or hurt. .  Had recent Gc/trich testing which was neg. Also had neg HIV recently. No complaints. She was advised to f/u with gyn when seen 5/2018 as I don't manage OCP. She has appt upcoming. Problem list updated as part of today's visit. Past medical, surgical, family history reviewed. Social History     Tobacco Use    Smoking status: Never Smoker    Smokeless tobacco: Never Used   Substance Use Topics    Alcohol use: Yes     Comment: occ    Drug use: No       Current Outpatient Medications   Medication Sig Dispense Refill    levonorgestrel-ethinyl estradiol (ORSYTHIA) 0.1-20 mg-mcg tab Take 1 Tab by mouth daily. 3 Package 3    montelukast (SINGULAIR) 10 mg tablet Take 10 mg by mouth daily. Indications: Allergic Rhinitis      cetirizine (ZYRTEC) 10 mg tablet Take 1 Tab by mouth daily as needed for Allergies. 90 Tab 1    fluticasone (FLONASE) 50 mcg/actuation nasal spray 2 Sprays by Both Nostrils route daily. 3 Bottle 3    albuterol (PROVENTIL HFA, VENTOLIN HFA, PROAIR HFA) 90 mcg/actuation inhaler Take 2 Puffs by inhalation every four (4) hours as needed for Wheezing. 3 Inhaler 1     Allergies: Patient has no known allergies. No LMP recorded. ROS:  Feeling well. No dyspnea or chest pain on exertion. No abdominal pain, change in bowel habits, black or bloody stools. No urinary tract symptoms. GYN ROS: normal menses, no abnormal bleeding, pelvic pain or discharge, no breast pain or new or enlarging lumps on self exam. No neurological complaints.     OBJECTIVE:   Visit Vitals  /78 (BP 1 Location: Left arm, BP Patient Position: Sitting)   Pulse 73   Temp 97.8 °F (36.6 °C) (Oral)   Resp 19   Ht 5' 7\" (1.702 m)   Wt 174 lb (78.9 kg)   LMP  (LMP Unknown)   SpO2 99%   BMI 27.25 kg/m²      Gen: The patient appears well, alert, oriented, in no distress. Pulm: Lungs are clear, good air entry, no wheezes, rhonchi or rales. CV: S1 and S2 normal, no murmurs, regular rate and rhythm. Extremities show no edema, normal peripheral pulses. Pelvic exam: normal external genitalia, vulva, vagina, cervix, uterus and adnexa. Scattered folliculitis like erythematous papules on inner thighs. No labial lesions    ASSESSMENT:   well woman    PLAN:   pap smear  return annually or prn   The plan was discussed with the patient. The patient verbalized understanding and is in agreement with the plan. Orders Placed This Encounter    CT/NG/T.VAGINALIS AMPLIFICATION     Standing Status:   Future     Standing Expiration Date:   11/13/2019     Order Specific Question:   Specimen type     Answer:   Vaginal [516]    HERPES SIMPLEX VIRUS (HSV) DANYA     Standing Status:   Future     Standing Expiration Date:   11/13/2019    PAP IG, RFX APTIMA HPV ASCUS ([id number]))     Standing Status:   Future     Standing Expiration Date:   5/12/2019     Order Specific Question:   Pap Source? Answer:   Endocervical     Order Specific Question:   Total Hysterectomy? Answer:   No     Order Specific Question:   Supracervical Hysterectomy? Answer:   No     Order Specific Question:   Post Menopausal?     Answer:   No     Order Specific Question:   Hormone Therapy? Answer:   No     Order Specific Question:   IUD? Answer:   No     Order Specific Question:   Abnormal Bleeding? Answer:   No     Order Specific Question:   Pregnant     Answer:   No     Order Specific Question:   Post Partum? Answer:    No

## 2018-11-12 NOTE — PATIENT INSTRUCTIONS

## 2018-11-16 ENCOUNTER — OFFICE VISIT (OUTPATIENT)
Dept: OBGYN CLINIC | Age: 18
End: 2018-11-16

## 2018-11-16 VITALS
HEART RATE: 80 BPM | DIASTOLIC BLOOD PRESSURE: 73 MMHG | HEIGHT: 67 IN | WEIGHT: 175 LBS | SYSTOLIC BLOOD PRESSURE: 115 MMHG | RESPIRATION RATE: 18 BRPM | OXYGEN SATURATION: 100 % | BODY MASS INDEX: 27.47 KG/M2

## 2018-11-16 DIAGNOSIS — Z30.09 FAMILY PLANNING: Primary | ICD-10-CM

## 2018-11-16 DIAGNOSIS — L70.0 ACNE VULGARIS: ICD-10-CM

## 2018-11-16 RX ORDER — LEVONORGESTREL AND ETHINYL ESTRADIOL 0.1-0.02MG
1 KIT ORAL DAILY
Qty: 3 PACKAGE | Refills: 3 | Status: SHIPPED | OUTPATIENT
Start: 2018-11-16 | End: 2019-04-17 | Stop reason: SDUPTHER

## 2018-11-16 NOTE — PROGRESS NOTES
Reached pt. She will call Dr Earnest Hernandez or [facility]. Pt to let us know where her appt is so we can send records.

## 2018-11-16 NOTE — PROGRESS NOTES
Patient presents with questions regarding recent pap smear. Advised patient that she should not have gotten a pap smear as they do NOT start until age 24. That recommendation has been in place since 2012. Patient also advised that LSIL is mildly abnormal indicating an exposure to HPV. The recommendation for her age is to repeat her pap smear next year. I also discussed the Gardasil vaccine which she states she received at 13 y/o. I encouraged her to maintain a healthy lifestyle (which ensures her immune system will continue to function at 100%) and to remain free of tobacco use which can cause progression of the disease. All questions were answered. The entire visit took approximately 20 minutes.

## 2018-11-17 LAB
HSV1 DNA SPEC QL NAA+PROBE: NEGATIVE
HSV2 DNA SPEC QL NAA+PROBE: NEGATIVE
SPECIMEN SOURCE: NORMAL

## 2019-02-20 ENCOUNTER — OFFICE VISIT (OUTPATIENT)
Dept: FAMILY MEDICINE CLINIC | Age: 19
End: 2019-02-20

## 2019-02-20 VITALS
OXYGEN SATURATION: 99 % | BODY MASS INDEX: 26.15 KG/M2 | DIASTOLIC BLOOD PRESSURE: 72 MMHG | HEART RATE: 66 BPM | HEIGHT: 67 IN | TEMPERATURE: 98.2 F | WEIGHT: 166.6 LBS | SYSTOLIC BLOOD PRESSURE: 118 MMHG | RESPIRATION RATE: 16 BRPM

## 2019-02-20 DIAGNOSIS — M46.1 SACROILIITIS (HCC): Primary | ICD-10-CM

## 2019-02-20 DIAGNOSIS — G89.29 CHRONIC RIGHT SI JOINT PAIN: Primary | ICD-10-CM

## 2019-02-20 DIAGNOSIS — M53.3 CHRONIC RIGHT SI JOINT PAIN: Primary | ICD-10-CM

## 2019-02-20 NOTE — PROGRESS NOTES
Assessment/Plan: 1. Chronic right SI joint pain- seems to be mechanical pain. may be related to leg length discrepancy/tilted pelvis. F/u with sports med. - AMB POC X-RAY SACROILIAC JTS <3 VW 
- REFERRAL TO PHYSICAL THERAPY The plan was discussed with the patient. The patient verbalized understanding and is in agreement with the plan. All medication potential side effects were discussed with the patient. Health Maintenance:  
Health Maintenance Topic Date Due  
 DTaP/Tdap/Td series (1 - Tdap) 04/26/2005  HPV Age 9Y-34Y (1 - Female 3-dose series) 04/26/2009  Influenza Age 5 to Adult  Completed  Hepatitis A Peds Age 1-18  Aged Out Marciano Failing is a 19 y.o. female and presents with Back Pain Subjective: 
Pt states since high school, she's had R low back pain. States it's worse with changing positions. States lately, after running, it's worse. Pain doesn't radiate. No paresthesias. She has tried massage and her masseuse states there's \"fluid buildup in her back\" and \"her tendons in the back seem enlarged\". Has tried SI joint stretches. Has also tried using a foam roller - which makes it feel better, but pain returns shortly thereafter. n she has been told her pelvis is tilted on the R and and her R leg is longer (when she did PT previously for a R knee injury). Has appt in 2 weeks with Dr. Bautista/sports med. ROS: 
Constitutional: No recent weight change. No weakness/fatigue. No f/c. Cardiovascular: No CP/palpitations. No SAUNDERS/orthopnea/PND. Respiratory: No cough/sputum, dyspnea, wheezing. Genitourinary: No dysuria, urinary hesitancy, nocturia, hematuria. No incontinence. Musculoskeletal: No joint pain/stiffness. No muscle pain/tenderness. Neurological: No seizures/numbness/weakness. No paresthesias. The problem list was updated as a part of today's visit. Patient Active Problem List  
Diagnosis Code  Wheezing R06.2  Acne vulgaris L70.0  Seasonal allergic rhinitis due to pollen J30.1  Eczema L30.9 The PSH, FH were reviewed. SH: Social History Tobacco Use  Smoking status: Never Smoker  Smokeless tobacco: Never Used Substance Use Topics  Alcohol use: Yes Comment: occ  Drug use: No  
 
 
Medications/Allergies: 
Current Outpatient Medications on File Prior to Visit Medication Sig Dispense Refill  levonorgestrel-ethinyl estradiol (ORSYTHIA) 0.1-20 mg-mcg tab Take 1 Tab by mouth daily. 3 Package 3  
 montelukast (SINGULAIR) 10 mg tablet Take 10 mg by mouth daily. Indications: Allergic Rhinitis  cetirizine (ZYRTEC) 10 mg tablet Take 1 Tab by mouth daily as needed for Allergies. 90 Tab 1  
 fluticasone (FLONASE) 50 mcg/actuation nasal spray 2 Sprays by Both Nostrils route daily. 3 Bottle 3  
 albuterol (PROVENTIL HFA, VENTOLIN HFA, PROAIR HFA) 90 mcg/actuation inhaler Take 2 Puffs by inhalation every four (4) hours as needed for Wheezing. 3 Inhaler 1 No current facility-administered medications on file prior to visit. No Known Allergies Objective: 
Visit Vitals /72 (BP 1 Location: Right arm, BP Patient Position: Sitting) Pulse 66 Temp 98.2 °F (36.8 °C) (Oral) Resp 16 Ht 5' 7\" (1.702 m) Wt 166 lb 9.6 oz (75.6 kg) LMP 02/10/2019 SpO2 99% BMI 26.09 kg/m² Constitutional: Well developed, nourished, no distress, alert CV: S1, S2.  RRR. No murmurs/rubs. No thrills palpated. No carotid bruits. Intact distal pulses. No edema. No aortic bruits. Pulm: No abnormalities on inspection. Clear to auscultation bilaterally. No wheezing/rhonchi. Normal effort. MS: Gait normal.  +pain over R sacral region (more medial than SI joint).

## 2019-02-20 NOTE — PATIENT INSTRUCTIONS
Low Back Pain: Exercises Your Care Instructions Here are some examples of typical rehabilitation exercises for your condition. Start each exercise slowly. Ease off the exercise if you start to have pain. Your doctor or physical therapist will tell you when you can start these exercises and which ones will work best for you. How to do the exercises Press-up 1. Lie on your stomach, supporting your body with your forearms. 2. Press your elbows down into the floor to raise your upper back. As you do this, relax your stomach muscles and allow your back to arch without using your back muscles. As your press up, do not let your hips or pelvis come off the floor. 3. Hold for 15 to 30 seconds, then relax. 4. Repeat 2 to 4 times. Alternate arm and leg (bird dog) exercise 1. Start on the floor, on your hands and knees. 2. Tighten your belly muscles. 3. Raise one leg off the floor, and hold it straight out behind you. Be careful not to let your hip drop down, because that will twist your trunk. 4. Hold for about 6 seconds, then lower your leg and switch to the other leg. 5. Repeat 8 to 12 times on each leg. 6. Over time, work up to holding for 10 to 30 seconds each time. 7. If you feel stable and secure with your leg raised, try raising the opposite arm straight out in front of you at the same time. Knee-to-chest exercise 1. Lie on your back with your knees bent and your feet flat on the floor. 2. Bring one knee to your chest, keeping the other foot flat on the floor (or keeping the other leg straight, whichever feels better on your lower back). 3. Keep your lower back pressed to the floor. Hold for at least 15 to 30 seconds. 4. Relax, and lower the knee to the starting position. 5. Repeat with the other leg. Repeat 2 to 4 times with each leg. 6. To get more stretch, put your other leg flat on the floor while pulling your knee to your chest. 
 
Curl-ups 1. Lie on the floor on your back with your knees bent at a 90-degree angle. Your feet should be flat on the floor, about 12 inches from your buttocks. 2. Cross your arms over your chest. If this bothers your neck, try putting your hands behind your neck (not your head), with your elbows spread apart. 3. Slowly tighten your belly muscles and raise your shoulder blades off the floor. 4. Keep your head in line with your body, and do not press your chin to your chest. 
5. Hold this position for 1 or 2 seconds, then slowly lower yourself back down to the floor. 6. Repeat 8 to 12 times. Pelvic tilt exercise 1. Lie on your back with your knees bent. 2. \"Brace\" your stomach. This means to tighten your muscles by pulling in and imagining your belly button moving toward your spine. You should feel like your back is pressing to the floor and your hips and pelvis are rocking back. 3. Hold for about 6 seconds while you breathe smoothly. 4. Repeat 8 to 12 times. Heel dig bridging 1. Lie on your back with both knees bent and your ankles bent so that only your heels are digging into the floor. Your knees should be bent about 90 degrees. 2. Then push your heels into the floor, squeeze your buttocks, and lift your hips off the floor until your shoulders, hips, and knees are all in a straight line. 3. Hold for about 6 seconds as you continue to breathe normally, and then slowly lower your hips back down to the floor and rest for up to 10 seconds. 4. Do 8 to 12 repetitions. Hamstring stretch in doorway 1. Lie on your back in a doorway, with one leg through the open door. 2. Slide your leg up the wall to straighten your knee. You should feel a gentle stretch down the back of your leg. 3. Hold the stretch for at least 15 to 30 seconds. Do not arch your back, point your toes, or bend either knee. Keep one heel touching the floor and the other heel touching the wall. 4. Repeat with your other leg. 5. Do 2 to 4 times for each leg. Hip flexor stretch 1. Kneel on the floor with one knee bent and one leg behind you. Place your forward knee over your foot. Keep your other knee touching the floor. 2. Slowly push your hips forward until you feel a stretch in the upper thigh of your rear leg. 3. Hold the stretch for at least 15 to 30 seconds. Repeat with your other leg. 4. Do 2 to 4 times on each side. Wall sit 1. Stand with your back 10 to 12 inches away from a wall. 2. Lean into the wall until your back is flat against it. 3. Slowly slide down until your knees are slightly bent, pressing your lower back into the wall. 4. Hold for about 6 seconds, then slide back up the wall. 5. Repeat 8 to 12 times. Follow-up care is a key part of your treatment and safety. Be sure to make and go to all appointments, and call your doctor if you are having problems. It's also a good idea to know your test results and keep a list of the medicines you take. Where can you learn more? Go to http://radha-murray.info/. Enter T104 in the search box to learn more about \"Low Back Pain: Exercises. \" Current as of: September 20, 2018 Content Version: 11.9 © 3320-0006 [employer]. Care instructions adapted under license by [employer] (which disclaims liability or warranty for this information). If you have questions about a medical condition or this instruction, always ask your healthcare professional. [place] [address] any warranty or liability for your use of this information.

## 2019-02-20 NOTE — PROGRESS NOTES
Andrea Daugherty is a 19 y.o. female (: 2000) presenting to follow up on: Chief Complaint Patient presents with  Back Pain Vitals:  
 19 5876 BP: 118/72 Pulse: 66 Resp: 16 Temp: 98.2 °F (36.8 °C) TempSrc: Oral  
SpO2: 99% Weight: 166 lb 9.6 oz (75.6 kg) Height: 5' 7\" (1.702 m) PainSc:   5 PainLoc: Back LMP: 02/10/2019 Coordination of Care Questionaire: 1. Have you been to the ER, urgent care clinic since your last visit? Hospitalized since your last visit? no 
 
2. Have you seen or consulted any other health care providers outside of the [address] [place] [address] since your last visit? Include any pap smears or colon screening. no 
 
Advanced Directive: 1. Do you have an Advanced Directive? NO 
 
2. Would you like information on Advanced Directives? NO Health Maintenance Due Topic Date Due  
 DTaP/Tdap/Td series (1 - Tdap) 2005  HPV Age 9Y-34Y (1 - Female 3-dose series) 2009

## 2019-02-25 ENCOUNTER — HOSPITAL ENCOUNTER (OUTPATIENT)
Dept: LAB | Age: 19
Discharge: HOME | End: 2019-02-25
Payer: OTHER GOVERNMENT

## 2019-02-25 DIAGNOSIS — M46.1 SACROILIITIS (HCC): ICD-10-CM

## 2019-02-25 LAB — ERYTHROCYTE [SEDIMENTATION RATE] IN BLOOD: 3 MM/HR (ref 0–20)

## 2019-02-25 PROCEDURE — 85652 RBC SED RATE AUTOMATED: CPT

## 2019-02-25 PROCEDURE — 36415 COLL VENOUS BLD VENIPUNCTURE: CPT

## 2019-02-25 PROCEDURE — 86141 C-REACTIVE PROTEIN HS: CPT

## 2019-02-25 PROCEDURE — 81374 HLA I TYPING 1 ANTIGEN LR: CPT

## 2019-02-26 LAB — CRP SERPL HS-MCNC: 1.34 MG/L (ref 0–3)

## 2019-02-28 LAB — HLA-B27 QL NAA+PROBE: NEGATIVE

## 2019-03-12 ENCOUNTER — HOSPITAL ENCOUNTER (OUTPATIENT)
Dept: PHYSICAL THERAPY | Age: 19
Discharge: HOME | End: 2019-03-12
Payer: OTHER GOVERNMENT

## 2019-03-12 PROCEDURE — 97110 THERAPEUTIC EXERCISES: CPT

## 2019-03-12 PROCEDURE — 97140 MANUAL THERAPY 1/> REGIONS: CPT

## 2019-03-12 PROCEDURE — 97162 PT EVAL MOD COMPLEX 30 MIN: CPT
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
## 2019-03-12 NOTE — PROGRESS NOTES
PHYSICAL THERAPY - DAILY TREATMENT NOTE    Patient Name: Channing Mtz        Date: 3/12/2019  : 2000   yes Patient  Verified  Visit #:   1   of   6  Insurance: Payor:  / Plan: [address] DEPENDENTS / Product Type:  /      In time: 8:55 Out time: 9:51   Total Treatment Time: 56     Medicare/[facility] Time Tracking (below)   Total Timed Codes (min):  N/A 1:1 Treatment Time:  N/A     TREATMENT AREA =  Sacroiliac pain [M53.3]    SUBJECTIVE  Pain Level (on 0 to 10 scale):  2  / 10   Medication Changes/New allergies or changes in medical history, any new surgeries or procedures?    no  If yes, update Summary List   Subjective Functional Status/Changes:  []  No changes reported     See initial eval          OBJECTIVE    15 min Therapeutic Exercise:  [x]  See flow sheet   Rationale:      increase strength to improve the patients ability to complete carrying/lifting objects and return to pain-free exercise/running. 10 min Manual Therapy: MET to correct R ant rotated innominate. STM/TPR to R glute max/med, piriformis and lumbosacral junction in prone. Rationale:      decrease pain, decrease trigger points and improve alignment to improve patient's ability to complete carrying/lifting objects and return to pain-free exercise/running. Billed With/As:   [x] TE   [] TA   [] Neuro   [] Self Care Patient Education: [x] Review HEP    [] Progressed/Changed HEP based on:   [] positioning   [] body mechanics   [] transfers   [] heat/ice application    [x] other: Pt instructed on and given HEP to be completed daily. Pt educated on red flags and to seek immediate medical attention/911 if those occur. Reviewed POC and goals. Pt reports understanding.      Other Objective/Functional Measures:    See initial eval     Post Treatment Pain Level (on 0 to 10) scale:   0  / 10     ASSESSMENT  Assessment/Changes in Function:     See initial eval     []  See Progress Note/Recertification   Patient will continue to benefit from skilled PT services to see initial eval   Progress toward goals / Updated goals:    Pt denied sharp pains or red flags with initial eval or therapeutic ex.  See initial eval.     PLAN  [x]  Upgrade activities as tolerated yes Continue plan of care   []  Discharge due to :    [x]  Other: PT 1x/week for 6 weeks (decreased frequency due to high co-pay, per pt request)     Therapist: Erasmo Morris PT    Date: 3/12/2019 Time: 9:51 AM     Future Appointments   Date Time Provider Adolfo Shay   3/14/2019  9:20 AM Osvaldo Williamson, [address]   3/21/2019 11:30 AM Connie Vance, PT [facility]   3/29/2019  9:30 AM Connie Vance, PT [facility]   4/4/2019  8:30 AM Connie Vance, PT [facility]   4/12/2019 10:00 AM Connie Vance, PT [facility]   4/18/2019  8:30 AM Afia Grigsby, PT [facility]

## 2019-03-21 ENCOUNTER — HOSPITAL ENCOUNTER (OUTPATIENT)
Dept: PHYSICAL THERAPY | Age: 19
Discharge: HOME | End: 2019-03-21
Payer: OTHER GOVERNMENT

## 2019-03-21 PROCEDURE — 97140 MANUAL THERAPY 1/> REGIONS: CPT

## 2019-03-21 PROCEDURE — 97110 THERAPEUTIC EXERCISES: CPT

## 2019-03-21 NOTE — PROGRESS NOTES
PHYSICAL THERAPY - DAILY TREATMENT NOTE Patient Name: Cyndi Merrill        Date: 3/21/2019 : 2000   yes Patient  Verified Visit #:   2   of   6  Insurance: Payor: [employer] / Plan: Adina Tong AND DEPENDENTS / Product Type: David Daugherty / In time: 11:35 Out time: 12:28 Total Treatment Time: 48 Medicare/[facility] Time Tracking (below) Total Timed Codes (min):  na 1:1 Treatment Time:  na  
TREATMENT AREA =  Sacroiliac pain [M53.3] SUBJECTIVE Pain Level (on 0 to 10 scale):  0  / 10 Medication Changes/New allergies or changes in medical history, any new surgeries or procedures?    no  If yes, update Summary List  
Subjective Functional Status/Changes:  []  No changes reported Pt reports no pain at rest but notices inc pain after a workout or run and when laying flat on the floor and sitting up. Reports compliance to HEP w/o difficulty or discomfort. OBJECTIVE 38 min Therapeutic Exercise:  [x]  See flow sheet Rationale:      increase ROM and increase strength to improve the patients ability to run/workout pain-free 15 min Manual Therapy: MET to correct R ant innom rotation, shotgun tech; DTM/TPR to R piriformis, glute max, iliopsoas Rationale:      decrease pain, increase ROM, increase tissue extensibility and decrease trigger points to improve patient's ability to ambulate Billed With/As: 
 [x] TE 
 [] TA 
 [] Neuro 
 [] Self Care Patient Education: [x] Review HEP [] Progressed/Changed HEP based on:  
[] positioning   [] body mechanics   [] transfers   [] heat/ice application   
[] other:   
Other Objective/Functional Measures: 
 
Multiple cavitations noted during shotgun technique for pubic clearing TTP to R iliopsoas, piriformis Significant fatigue noted to R glutes in prone hip ext Tactile and verbal cueing for LE Alignment lat tap downs Post Treatment Pain Level (on 0 to 10) scale:   0  / 10 ASSESSMENT Assessment/Changes in Function: Pt stated she has her PT testing tomorrow including a 2 mile run; advised to perform miniband prep prior to activate glutes and reduce likelihood of inc soreness after 
  
[]  See Progress Note/Recertification Patient will continue to benefit from skilled PT services to modify and progress therapeutic interventions, address functional mobility deficits, address ROM deficits, address strength deficits, analyze and address soft tissue restrictions, analyze and cue movement patterns, analyze and modify body mechanics/ergonomics, assess and modify postural abnormalities and instruct in home and community integration to attain remaining goals. Progress toward goals / Updated goals: 
Reports compliance to HEP, progress to STG #1 PLAN 
[]  Upgrade activities as tolerated yes Continue plan of care  
[]  Discharge due to :   
[]  Other:   
 
Therapist: Melanie Lawrence PT Date: 3/21/2019 Time: 11:41 AM  
 
Future Appointments Date Time Provider Adolfo Shay 3/29/2019  9:30 AM Connie Vance, PT [facility]  
4/4/2019  8:30 AM Ashley Vance [facility]  
4/12/2019 10:00 AM Connie Vance, PT [facility]  
4/18/2019  8:30 AM Afia Grigsby, PT [facility]

## 2019-03-29 ENCOUNTER — APPOINTMENT (OUTPATIENT)
Dept: PHYSICAL THERAPY | Age: 19
End: 2019-03-29
Payer: OTHER GOVERNMENT

## 2019-04-04 ENCOUNTER — APPOINTMENT (OUTPATIENT)
Dept: PHYSICAL THERAPY | Age: 19
End: 2019-04-04
Payer: OTHER GOVERNMENT

## 2019-04-12 ENCOUNTER — HOSPITAL ENCOUNTER (OUTPATIENT)
Dept: PHYSICAL THERAPY | Age: 19
Discharge: HOME | End: 2019-04-12
Payer: OTHER GOVERNMENT

## 2019-04-12 PROCEDURE — 97140 MANUAL THERAPY 1/> REGIONS: CPT

## 2019-04-12 PROCEDURE — 97110 THERAPEUTIC EXERCISES: CPT

## 2019-04-12 NOTE — PROGRESS NOTES
PHYSICAL THERAPY - DAILY TREATMENT NOTE Patient Name: Sharon Bee        Date: 2019 : 2000   yes Patient  Verified Visit #:   3   of   6  Insurance: Payor:  / Plan: Mikiely Tevin DEPENDENTS / Product Type: Olivia Shamyesenia / In time: 9:58 Out time: 10:46 Total Treatment Time: 48 Medicare/[facility] Time Tracking (below) Total Timed Codes (min):  na 1:1 Treatment Time:  na  
TREATMENT AREA =  Sacroiliac pain [M53.3] SUBJECTIVE Pain Level (on 0 to 10 scale):  1  / 10 Medication Changes/New allergies or changes in medical history, any new surgeries or procedures?    no  If yes, update Summary List  
Subjective Functional Status/Changes:  []  No changes reported Pt reports she ran 3 miles this morning and had no pain during but did c/o some soreness R SIJ after OBJECTIVE Modalities Rationale:     decrease inflammation and decrease pain to improve patient's ability to ambulate 
 min [] Estim, type/location:   
                                 []  att     []  unatt     []  w/US     []  w/ice    []  w/heat 
 min []  Mechanical Traction: type/lbs   
               []  pro   []  sup   []  int   []  cont    []  before manual    []  after manual  
 min []  Ultrasound, settings/location:    
 min []  Iontophoresis w/ dexamethasone, location:   
                                           []  take home patch       []  in clinic  
10 min [x]  Ice     []  Heat    location/position: To the   
 min []  Vasopneumatic Device, press/temp:   
 min []  Other:   
[] Skin assessment post-treatment (if applicable):   
[]  intact    []  redness- no adverse reaction    
[]redness  adverse reaction:     
28 min Therapeutic Exercise:  [x]  See flow sheet Rationale:      increase ROM and increase strength to improve the patients ability to run/work out 10 min Manual Therapy: stm to the R glute max, piri, CFM to sacrotuberous ligament, MET to correct R ant innom, shotgun tech Rationale:      decrease pain, increase ROM, increase tissue extensibility and decrease trigger points to improve patient's ability to ambulate Billed With/As: 
 [x] TE 
 [] TA 
 [] Neuro 
 [] Self Care Patient Education: [x] Review HEP [] Progressed/Changed HEP based on:  
[] positioning   [] body mechanics   [] transfers   [] heat/ice application   
[] other:   
Other Objective/Functional Measures: 
 
Unable to achieve cavitation to shotgun tech, resulting in inc pain to R SIJ Pt req cueing for glute contraction to maintain hip ext w/ SB HS curl Post Treatment Pain Level (on 0 to 10) scale:   0  / 10 ASSESSMENT Assessment/Changes in Function:  
 
Pt reported no pain post tx session. Plan to progress as able at [place]. Advised continuation of glute activation prior to running and working out 
  
[]  See Progress Note/Recertification Patient will continue to benefit from skilled PT services to modify and progress therapeutic interventions, address functional mobility deficits, address ROM deficits, address strength deficits, analyze and address soft tissue restrictions, analyze and cue movement patterns, analyze and modify body mechanics/ergonomics, assess and modify postural abnormalities and instruct in home and community integration to attain remaining goals. Progress toward goals / Updated goals: 
Min cueing for lat tap downs, progress to STG #2 PLAN 
[]  Upgrade activities as tolerated yes Continue plan of care  
[]  Discharge due to :   
[]  Other:   
 
Therapist: Siddhartha Guadalupe PT Date: 4/12/2019 Time: 10:11 AM  
 
Future Appointments Date Time Provider Adolfo Shay 4/18/2019  8:30 AM Antolin Vance, PT [facility]

## 2019-04-15 DIAGNOSIS — L30.9 ECZEMA, UNSPECIFIED TYPE: ICD-10-CM

## 2019-04-15 RX ORDER — DESONIDE 0.5 MG/G
CREAM TOPICAL 2 TIMES DAILY
Qty: 60 G | Refills: 0 | Status: SHIPPED | OUTPATIENT
Start: 2019-04-15

## 2019-04-15 RX ORDER — DESONIDE 0.5 MG/G
CREAM TOPICAL 2 TIMES DAILY
Qty: 60 G | Refills: 0 | Status: SHIPPED | OUTPATIENT
Start: 2019-04-15 | End: 2019-04-15 | Stop reason: SDUPTHER

## 2019-04-17 DIAGNOSIS — L70.0 ACNE VULGARIS: ICD-10-CM

## 2019-04-17 DIAGNOSIS — Z30.09 FAMILY PLANNING: ICD-10-CM

## 2019-04-17 RX ORDER — LEVONORGESTREL AND ETHINYL ESTRADIOL 0.1-0.02MG
1 KIT ORAL DAILY
Qty: 3 PACKAGE | Refills: 3 | Status: SHIPPED | OUTPATIENT
Start: 2019-04-17 | End: 2020-01-15 | Stop reason: SDUPTHER

## 2019-04-18 ENCOUNTER — HOSPITAL ENCOUNTER (OUTPATIENT)
Dept: PHYSICAL THERAPY | Age: 19
Discharge: HOME | End: 2019-04-18
Payer: OTHER GOVERNMENT

## 2019-04-18 PROCEDURE — 97140 MANUAL THERAPY 1/> REGIONS: CPT

## 2019-04-18 PROCEDURE — 97110 THERAPEUTIC EXERCISES: CPT

## 2019-04-18 NOTE — PROGRESS NOTES
PHYSICAL THERAPY - DAILY TREATMENT NOTE Patient Name: Junie Devries        Date: 2019 : 2000   yes Patient  Verified Visit #:   4   of   6  Insurance: Payor:  [facility] / Product Type: Leia Heman / In time: 8:37 Out time: 9:31 Total Treatment Time: 47 Medicare/[employer] Time Tracking (below) Total Timed Codes (min):  na 1:1 Treatment Time:  na  
TREATMENT AREA =  Sacroiliac pain [M53.3] SUBJECTIVE Pain Level (on 0 to 10 scale):  2  / 10 Medication Changes/New allergies or changes in medical history, any new surgeries or procedures?    no  If yes, update Summary List  
Subjective Functional Status/Changes:  []  No changes reported Pt reports she went for a 2 mile run this AM and had no pain during but reports tightness in the l/s afterwards OBJECTIVE Modalities Rationale:     decrease inflammation and decrease pain to improve patient's ability to complete ADLs 
 min [] Estim, type/location:   
                                 []  att     []  unatt     []  w/US     []  w/ice    []  w/heat 
 min []  Mechanical Traction: type/lbs   
               []  pro   []  sup   []  int   []  cont    []  before manual    []  after manual  
 min []  Ultrasound, settings/location:    
 min []  Iontophoresis w/ dexamethasone, location:   
                                           []  take home patch       []  in clinic  
10 min [x]  Ice     []  Heat    location/position: To the l/s in prone  
 min []  Vasopneumatic Device, press/temp:   
 min []  Other:   
[x] Skin assessment post-treatment (if applicable):   
[x]  intact    []  redness- no adverse reaction    
[]redness  adverse reaction:     
29 min Therapeutic Exercise:  [x]  See flow sheet Rationale:      increase ROM and increase strength to improve the patients ability to complete ADLs 15 min Manual Therapy: MFR to lumbosacral junction, STM to R>L distal t/s jose, shotgun tech Rationale:      decrease pain, increase ROM, increase tissue extensibility and decrease trigger points to improve patient's ability to complete ADLs Billed With/As: 
 [x] TE 
 [] TA 
 [] Neuro 
 [] Self Care Patient Education: [x] Review HEP [] Progressed/Changed HEP based on:  
[] positioning   [] body mechanics   [] transfers   [] heat/ice application   
[] other:   
Other Objective/Functional Measures: Added multiple ex per flow to improve axial stability Pt req tactile cueing to maintain neutral spine in birddog and bear crawls Assist to correct form w/ lateral planks; dec hip ext and abd noted FOTO 94/100 Post Treatment Pain Level (on 0 to 10) scale:   0  / 10 ASSESSMENT Assessment/Changes in Function:  
 
Pt denied pain w/ additional therex. Provided printout of higher level core HEP. Pt to perform HEP independently x 2 weeks to assess ability to self manage. Will plan to f/u at that time to determine cont vs dc 
  
[]  See Progress Note/Recertification Patient will continue to benefit from skilled PT services to modify and progress therapeutic interventions, address functional mobility deficits, address ROM deficits, address strength deficits, analyze and address soft tissue restrictions, analyze and cue movement patterns, analyze and modify body mechanics/ergonomics, assess and modify postural abnormalities and instruct in home and community integration to attain remaining goals. Progress toward goals / Updated goals: LTG #1 met, FOTO improved 22 points from IE  
 
PLAN 
[]  Upgrade activities as tolerated yes Continue plan of care  
[]  Discharge due to :   
[]  Other:   
 
Therapist: Lisa Mcginnis PT Date: 4/18/2019 Time: 8:55 AM  
 
No future appointments.

## 2019-05-23 NOTE — PROGRESS NOTES
[address]  [facility] THERAPY  [address] [address],[facility], [address] [place] [address] - Phone: [phone]  Fax: [phone]    DISCHARGE NOTE  Patient Name: Nayeli Hensley : 2000   Treatment/Medical Diagnosis: Sacroiliac pain [M53.3]   Referral Source: Dar Moya MD     Date of Initial Visit: 3/12/19 Attended Visits: 4 Missed Visits: 0       SUMMARY OF TREATMENT  Pt attended only initial evaluation and     3     follow-ups and then did not return. Therefore a formal reassessment of goals was not performed. RECOMMENDATIONS  Discontinue physical therapy due to patient not returning. If you have any questions/comments please contact us directly at [phone]. Thank you for allowing us to assist in the care of your patient.     Therapist Signature: Bryan Tomas PT Date: 19     Time: 7:43 AM

## 2019-06-03 ENCOUNTER — OFFICE VISIT (OUTPATIENT)
Dept: OBGYN CLINIC | Age: 19
End: 2019-06-03

## 2019-06-03 VITALS
BODY MASS INDEX: 24.17 KG/M2 | WEIGHT: 154 LBS | RESPIRATION RATE: 18 BRPM | OXYGEN SATURATION: 100 % | TEMPERATURE: 98.7 F | HEART RATE: 61 BPM | SYSTOLIC BLOOD PRESSURE: 115 MMHG | HEIGHT: 67 IN | DIASTOLIC BLOOD PRESSURE: 78 MMHG

## 2019-06-03 DIAGNOSIS — R87.612 LGSIL ON PAP SMEAR OF CERVIX: Primary | ICD-10-CM

## 2019-06-03 NOTE — PROGRESS NOTES
Subjective:      Radha Velez is a 22 y.o. female seen for evaluation of abnormal Pap smear. She was seen in November after having had a pap result of LGSIL. Her PCP performed a pap when she was 19years old because of complaints regarding pain with intercourse. I previously discussed the results with Ms. Johnathan Parikh and advised her that we do not begin pap smears until 22years old and per her result, the standard of care and ACOG/ASCCP recommendation is to repeat her pap smear in 12 months from the date of the initial screening. She expressed concern and stated that she wanted her pap smear repeated today. She is also a Nursing student and was advised at school of the recommendations for repeating her pap smear at 6 months and that they start at 25 or when sexual intercourse begins. I, again, reiterated the standard of care and recommendations listed above and suggested that she was until November for her annual exam before her pap smear is repeated. She also expressed concern about possibly enlarged lymph nodes in her groin region bilaterally. Patient Active Problem List   Diagnosis Code    Wheezing R06.2    Acne vulgaris L70.0    Seasonal allergic rhinitis due to pollen J30.1    Eczema L30.9     Patient Active Problem List    Diagnosis Date Noted    Wheezing 05/18/2018    Acne vulgaris 05/18/2018    Seasonal allergic rhinitis due to pollen 05/18/2018    Eczema 05/18/2018     Current Outpatient Medications   Medication Sig Dispense Refill    levonorgestrel-ethinyl estradiol (ORSYTHIA) 0.1-20 mg-mcg tab Take 1 Tab by mouth daily. 3 Package 3    desonide (TRIDESILON) 0.05 % cream Apply  to affected area two (2) times a day. Apply pea-sized amount bid x 7 days prn eczema 60 g 0    montelukast (SINGULAIR) 10 mg tablet Take 10 mg by mouth daily. Indications: Allergic Rhinitis      cetirizine (ZYRTEC) 10 mg tablet Take 1 Tab by mouth daily as needed for Allergies.  90 Tab 1    fluticasone (FLONASE) 50 mcg/actuation nasal spray 2 Sprays by Both Nostrils route daily. 3 Bottle 3    albuterol (PROVENTIL HFA, VENTOLIN HFA, PROAIR HFA) 90 mcg/actuation inhaler Take 2 Puffs by inhalation every four (4) hours as needed for Wheezing. 3 Inhaler 1     No Known Allergies  Past Medical History:   Diagnosis Date    Allergic rhinitis     H/O seasonal allergies      History reviewed. No pertinent surgical history. Family History   Problem Relation Age of Onset    Cancer Mother     Thyroid Disease Father      Social History     Tobacco Use    Smoking status: Never Smoker    Smokeless tobacco: Never Used   Substance Use Topics    Alcohol use: Yes     Comment: occ      Review of Systems    A comprehensive review of systems was negative except for that written in the HPI. Objective:     Visit Vitals  /78 (BP 1 Location: Left arm, BP Patient Position: Sitting)   Pulse 61   Temp 98.7 °F (37.1 °C) (Oral)   Resp 18   Ht 5' 7\" (1.702 m)   Wt 154 lb (69.9 kg)   LMP 04/29/2019   SpO2 100%   BMI 24.12 kg/m²      Physical Exam:   General appearance - alert, well appearing, and in no distress  Pelvic - VULVA: normal appearing vulva with no masses, tenderness or lesions, groin region palpated bilaterally with no evidence of lymphadenopathy     Assessment/Plan:       ICD-10-CM ICD-9-CM    1. LGSIL on Pap smear of cervix R87.612 795.03      Encounter Diagnoses   Name Primary?  LGSIL on Pap smear of cervix Yes     No orders of the defined types were placed in this encounter.

## 2019-11-12 ENCOUNTER — OFFICE VISIT (OUTPATIENT)
Dept: FAMILY MEDICINE CLINIC | Age: 19
End: 2019-11-12

## 2019-11-12 ENCOUNTER — HOSPITAL ENCOUNTER (OUTPATIENT)
Dept: LAB | Age: 19
Discharge: HOME | End: 2019-11-12
Payer: OTHER GOVERNMENT

## 2019-11-12 VITALS
HEART RATE: 66 BPM | BODY MASS INDEX: 22.91 KG/M2 | OXYGEN SATURATION: 100 % | HEIGHT: 67 IN | WEIGHT: 146 LBS | RESPIRATION RATE: 18 BRPM | TEMPERATURE: 97.2 F | SYSTOLIC BLOOD PRESSURE: 117 MMHG | DIASTOLIC BLOOD PRESSURE: 74 MMHG

## 2019-11-12 DIAGNOSIS — R30.0 DYSURIA: Primary | ICD-10-CM

## 2019-11-12 DIAGNOSIS — Z11.3 SCREENING FOR STD (SEXUALLY TRANSMITTED DISEASE): ICD-10-CM

## 2019-11-12 DIAGNOSIS — R30.0 DYSURIA: ICD-10-CM

## 2019-11-12 LAB
BILIRUB UR QL STRIP: NEGATIVE
GLUCOSE UR-MCNC: NEGATIVE MG/DL
KETONES P FAST UR STRIP-MCNC: NEGATIVE MG/DL
PH UR STRIP: 6 [PH] (ref 4.6–8)
PROT UR QL STRIP: NEGATIVE
SP GR UR STRIP: 1.01 (ref 1–1.03)
UA UROBILINOGEN AMB POC: NORMAL (ref 0.2–1)
URINALYSIS CLARITY POC: CLEAR
URINALYSIS COLOR POC: YELLOW
URINE BLOOD POC: NORMAL
URINE LEUKOCYTES POC: NORMAL
URINE NITRITES POC: NEGATIVE

## 2019-11-12 PROCEDURE — 87491 CHLMYD TRACH DNA AMP PROBE: CPT

## 2019-11-12 PROCEDURE — 87086 URINE CULTURE/COLONY COUNT: CPT

## 2019-11-12 RX ORDER — NITROFURANTOIN 25; 75 MG/1; MG/1
100 CAPSULE ORAL 2 TIMES DAILY
Qty: 10 CAP | Refills: 0 | Status: SHIPPED | OUTPATIENT
Start: 2019-11-12 | End: 2019-11-17

## 2019-11-12 NOTE — PATIENT INSTRUCTIONS
Painful Urination (Dysuria): Care Instructions Your Care Instructions Burning pain with urination (dysuria) is a common symptom of a urinary tract infection or other urinary problems. The bladder may become inflamed. This can cause pain when the bladder fills and empties. You may also feel pain if the tube that carries urine from the bladder to the outside of the body (urethra) gets irritated or infected. Sexually transmitted infections (STIs) also may cause pain when you urinate. Sometimes the pain can be caused by things other than an infection. The urethra can be irritated by soaps, perfumes, or foreign objects in the urethra. Kidney stones can cause pain when they pass through the urethra. The cause may be hard to find. You may need tests. Treatment for painful urination depends on the cause. Follow-up care is a key part of your treatment and safety. Be sure to make and go to all appointments, and call your doctor if you are having problems. It's also a good idea to know your test results and keep a list of the medicines you take. How can you care for yourself at home? · Drink extra water for the next day or two. This will help make the urine less concentrated. (If you have kidney, heart, or liver disease and have to limit fluids, talk with your doctor before you increase the amount of fluids you drink.) · Avoid drinks that are carbonated or have caffeine. They can irritate the bladder. · Urinate often. Try to empty your bladder each time. For women: · Urinate right after you have sex. · After going to the bathroom, wipe from front to back. · Avoid douches, bubble baths, and feminine hygiene sprays. And avoid other feminine hygiene products that have deodorants. When should you call for help? Call your doctor now or seek immediate medical care if: 
  · You have new symptoms, such as fever, nausea, or vomiting.  
  · You have new or worse symptoms of a urinary problem. For example: ? You have blood or pus in your urine. ? You have chills or body aches. ? It hurts worse to urinate. ? You have groin or belly pain. ? You have pain in your back just below your rib cage (the flank area).  
 Watch closely for changes in your health, and be sure to contact your doctor if you have any problems. Where can you learn more? Go to http://radha-murray.info/. Enter J173 in the search box to learn more about \"Painful Urination (Dysuria): Care Instructions. \" Current as of: December 19, 2018 Content Version: 12.2 © 1944-7366 [employer]. Care instructions adapted under license by [employer] (which disclaims liability or warranty for this information). If you have questions about a medical condition or this instruction, always ask your healthcare professional. [place] [address] any warranty or liability for your use of this information. Exposure to Sexually Transmitted Infections: Care Instructions Your Care Instructions Sexually transmitted infections (STIs) are those diseases spread by sexual contact. There are at least 20 different STIs, including chlamydia, gonorrhea, syphilis, and human immunodeficiency virus (HIV), which causes AIDS. Bacteria-caused STIs can be treated and cured. STIs caused by viruses, such as HIV, can be treated but not cured. Some STIs can reduce a woman's chances of getting pregnant in the future. STIs are spread during sexual contact, such as vaginal intercourse and oral or anal sex. Follow-up care is a key part of your treatment and safety. Be sure to make and go to all appointments, and call your doctor if you are having problems. It's also a good idea to know your test results and keep a list of the medicines you take. How can you care for yourself at home? · Your doctor may have given you a shot of antibiotics. If your doctor prescribed antibiotic pills, take them as directed.  Do not stop taking them just because you feel better. You need to take the full course of antibiotics. · Do not have sexual contact while you have symptoms of an STI or are being treated for an STI. · Tell your sex partner (or partners) that he or she will need treatment. · If you are a woman, do not douche. Douching changes the normal balance of bacteria in the vagina and may spread an infection up into your reproductive organs. To prevent exposure to STIs in the future · Use latex condoms every time you have sex. Use them from the beginning to the end of sexual contact. · Talk to your partner before you have sex. Find out if he or she has or is at risk for any STI. Keep in mind that a person may be able to spread an STI even if he or she does not have symptoms. · Do not have sex if you are being treated for an STI. · Do not have sex with anyone who has symptoms of an STI, such as sores on the genitals or mouth. · Having one sex partner (who does not have STIs and does not have sex with anyone else) is a good way to avoid STIs. When should you call for help? Call your doctor now or seek immediate medical care if: 
  · You have new pain in your belly or pelvis.  
  · You have symptoms of a urinary tract infection. These may include: 
? Pain or burning when you urinate. ? A frequent need to urinate without being able to pass much urine. ? Pain in the flank, which is just below the rib cage and above the waist on either side of the back. ? Blood in your urine. ? A fever.  
  · You have new or worsening pain or swelling in the scrotum.  
 Watch closely for changes in your health, and be sure to contact your doctor if: 
  · You have unusual vaginal bleeding.  
  · You have a discharge from the vagina or penis.  
  · You have any new symptoms, such as sores, bumps, rashes, blisters, or warts.  
  · You have itching, tingling, pain, or burning in the genital or anal area.  
  · You think you may have an STI. Where can you learn more? Go to http://radha-murray.info/. Enter M109 in the search box to learn more about \"Exposure to Sexually Transmitted Infections: Care Instructions. \" Current as of: September 11, 2018 Content Version: 12.2 © 6506-5953 [employer]. Care instructions adapted under license by [employer] (which disclaims liability or warranty for this information). If you have questions about a medical condition or this instruction, always ask your healthcare professional. [place] [address] any warranty or liability for your use of this information. Urinary Tract Infection in Women: Care Instructions Your Care Instructions A urinary tract infection, or UTI, is a general term for an infection anywhere between the kidneys and the urethra (where urine comes out). Most UTIs are bladder infections. They often cause pain or burning when you urinate. UTIs are caused by bacteria and can be cured with antibiotics. Be sure to complete your treatment so that the infection goes away. Follow-up care is a key part of your treatment and safety. Be sure to make and go to all appointments, and call your doctor if you are having problems. It's also a good idea to know your test results and keep a list of the medicines you take. How can you care for yourself at home? · Take your antibiotics as directed. Do not stop taking them just because you feel better. You need to take the full course of antibiotics. · Drink extra water and other fluids for the next day or two. This may help wash out the bacteria that are causing the infection. (If you have kidney, heart, or liver disease and have to limit fluids, talk with your doctor before you increase your fluid intake.) · Avoid drinks that are carbonated or have caffeine. They can irritate the bladder. · Urinate often. Try to empty your bladder each time. · To relieve pain, take a hot bath or lay a heating pad set on low over your lower belly or genital area. Never go to sleep with a heating pad in place. To prevent UTIs · Drink plenty of water each day. This helps you urinate often, which clears bacteria from your system. (If you have kidney, heart, or liver disease and have to limit fluids, talk with your doctor before you increase your fluid intake.) · Urinate when you need to. · Urinate right after you have sex. · Change sanitary pads often. · Avoid douches, bubble baths, feminine hygiene sprays, and other feminine hygiene products that have deodorants. · After going to the bathroom, wipe from front to back. When should you call for help? Call your doctor now or seek immediate medical care if: 
  · Symptoms such as fever, chills, nausea, or vomiting get worse or appear for the first time.  
  · You have new pain in your back just below your rib cage. This is called flank pain.  
  · There is new blood or pus in your urine.  
  · You have any problems with your antibiotic medicine.  
 Watch closely for changes in your health, and be sure to contact your doctor if: 
  · You are not getting better after taking an antibiotic for 2 days.  
  · Your symptoms go away but then come back. Where can you learn more? Go to http://radha-murray.info/. Enter A691 in the search box to learn more about \"Urinary Tract Infection in Women: Care Instructions. \" Current as of: December 19, 2018 Content Version: 12.2 © 8525-3961 [employer]. Care instructions adapted under license by [employer] (which disclaims liability or warranty for this information). If you have questions about a medical condition or this instruction, always ask your healthcare professional. [place] [address] any warranty or liability for your use of this information. Safer Sex: Care Instructions Your Care Instructions Safer sex is a way to reduce your risk of getting an infection spread through sex. It can also help prevent pregnancy. Most infections that are spread through sex, also called sexually transmitted infections or STIs, can be cured. But some can decrease your chances of getting pregnant if they are not treated early. Others, such as herpes, have no cure. And some, such as HIV, can be deadly. Several products can help you practice safer sex and reduce your chance of STIs. One of the best is a condom. There are condoms for men and for women. The female condom is a tube of soft plastic with a closed end that is placed deep into the vagina. You can use a special rubber sheet (dental dam) for protection during oral sex. Disposable gloves can keep your hands from touching blood, semen, or other body fluids that can carry infections. Remember that birth control methods such as diaphragms, IUDs, foams, and birth control pills do not stop you from getting STIs. Follow-up care is a key part of your treatment and safety. Be sure to make and go to all appointments, and call your doctor if you are having problems. It's also a good idea to know your test results and keep a list of the medicines you take. How can you care for yourself at home? · Think about getting shots to prevent hepatitis A and hepatitis B. These two diseases can be spread through sex. You also can get hepatitis A if you eat infected food. · Use condoms or female condoms each time and every time you have sex. · Learn the right way to use a male condom: 
? Condoms come in several sizes. Make sure you use the right size. A condom that is too small can break easily. A condom that is too big can slip off during sex. Use a new condom each time you have sex. ? Be careful not to poke a hole in the condom when you open the wrapper. ? Squeeze the tip of the condom to keep out air. ? Pull down the loose skin (foreskin) from the head of an uncircumcised penis. ? While squeezing the tip of the condom, unroll it all the way down to the base of the firm penis. ? Never use petroleum jelly (such as Vaseline), grease, hand lotion, baby oil, or anything with oil in it. These products can make holes in the condom. ? After sex, hold the condom on your penis as you remove your penis from your partner. This will keep semen from spilling out of the condom. · Learn to use a female condom: ? You can put in a female condom up to 8 hours before sex. ? Squeeze the smaller ring at the closed end and insert it deep into the vagina. The larger ring at the open end should stay outside the vagina. ? During sex, make sure the penis goes into the condom. ? After the penis is removed, close the open end of the condom by twisting it. Remove the condom. · Do not use a female condom and male condom at the same time. · Do not have sex with anyone who has symptoms of an STI, such as sores on the genitals or mouth. The herpes virus that causes cold sores can spread to and from the penis and vagina. · Do not drink a lot of alcohol or use drugs before sex. This can cause you to let down your guard and not practice safer sex. · Having one sex partner (who does not have STIs and does not have sex with anyone else) is a sure way to avoid STIs. · Talk to your partner before you have sex. Find out if he or she has or is at risk for any STI. Keep in mind that a person may be able to spread an STI even if he or she does not have symptoms. You and your partner may want to get an HIV test. You should get tested again 6 months later. Where can you learn more? Go to http://radha-murray.info/. Enter M358 in the search box to learn more about \"Safer Sex: Care Instructions. \" Current as of: September 11, 2018 Content Version: 12.2 © 7529-5623 Healthwise, [facility].  Care instructions adapted under license by [employer] (which disclaims liability or warranty for this information). If you have questions about a medical condition or this instruction, always ask your healthcare professional. [place] [address] any warranty or liability for your use of this information.

## 2019-11-12 NOTE — PROGRESS NOTES
HISTORY OF PRESENT ILLNESS  Monica Jasmine is a 22 y.o. female. HPI  C/o dysuria, started 3 days ago, no fever or chills, no hematuria, no flank pain  Wants std check since she had unprotected intercourse last week, no discharge  Review of Systems   Constitutional: Negative for chills and fever. Genitourinary: Negative for flank pain. Physical Exam   Pulmonary/Chest: Effort normal and breath sounds normal.   Abdominal: There is tenderness in the suprapubic area. There is no CVA tenderness. ASSESSMENT and PLAN  Diagnoses and all orders for this visit:    1. Dysuria, UTI  -     AMB POC URINALYSIS DIP STICK AUTO W/O MICRO  -     CULTURE, URINE; Future  -     nitrofurantoin, macrocrystal-monohydrate, (MACROBID) 100 mg capsule; Take 1 Cap by mouth two (2) times a day for 5 days. 2. Screening for STD (sexually transmitted disease)  -     CT/NG/T.VAGINALIS AMPLIFICATION;  Future  - advised to practice safe sex and use protection at all times    Results for orders placed or performed in visit on 11/12/19   AMB POC URINALYSIS DIP STICK AUTO W/O MICRO   Result Value Ref Range    Color (UA POC) Yellow     Clarity (UA POC) Clear     Glucose (UA POC) Negative Negative    Bilirubin (UA POC) Negative Negative    Ketones (UA POC) Negative Negative    Specific gravity (UA POC) 1.015 1.001 - 1.035    Blood (UA POC) Trace Negative    pH (UA POC) 6.0 4.6 - 8.0    Protein (UA POC) Negative Negative    Urobilinogen (UA POC) 0.2 mg/dL 0.2 - 1    Nitrites (UA POC) Negative Negative    Leukocyte esterase (UA POC) 1+ Negative

## 2019-11-12 NOTE — PROGRESS NOTES
Joe Garcia is a 22 y.o. female (: 2000) presenting to address:    Chief Complaint   Patient presents with    Urinary Burning    New Order     STD test       Vitals:    19 0830   BP: 117/74   Pulse: 66   Resp: 18   Temp: 97.2 °F (36.2 °C)   SpO2: 100%   Weight: 146 lb (66.2 kg)   Height: 5' 7\" (1.702 m)   PainSc:   0 - No pain   LMP: 10/22/2019       Learning Assessment:     Learning Assessment 2018   PRIMARY LEARNER Patient   HIGHEST LEVEL OF EDUCATION - PRIMARY LEARNER  2 YEARS OF COLLEGE   BARRIERS PRIMARY LEARNER NONE   CO-LEARNER CAREGIVER No   PRIMARY LANGUAGE ENGLISH    NEED No   LEARNER PREFERENCE PRIMARY READING   LEARNING SPECIAL TOPICS none   ANSWERED BY patient   RELATIONSHIP SELF   ASSESSMENT COMMENT n/a     Depression Screening:     3 most recent PHQ Screens 2019   Little interest or pleasure in doing things Not at all   Feeling down, depressed, irritable, or hopeless Not at all   Total Score PHQ 2 0     Fall Risk Assessment:     Fall Risk Assessment, last 12 mths 2019   Able to walk? Yes   Fall in past 12 months? No     Abuse Screening:     Abuse Screening Questionnaire 2019   Do you ever feel afraid of your partner? N   Are you in a relationship with someone who physically or mentally threatens you? N   Is it safe for you to go home? Y     Coordination of Care Questionaire:   1. Have you been to the ER, urgent care clinic since your last visit? Hospitalized since your last visit? NO    2. Have you seen or consulted any other health care providers outside of the [address] [place] [address] since your last visit? Include any pap smears or colon screening. NO    Advanced Directive:   1. Do you have an Advanced Directive? YES    2. Would you like information on Advanced Directives?  NO

## 2019-11-14 LAB
BACTERIA SPEC CULT: NORMAL
SERVICE CMNT-IMP: NORMAL

## 2019-11-15 ENCOUNTER — TELEPHONE (OUTPATIENT)
Dept: FAMILY MEDICINE CLINIC | Age: 19
End: 2019-11-15

## 2019-11-15 LAB
C TRACH RRNA SPEC QL NAA+PROBE: POSITIVE
N GONORRHOEA RRNA SPEC QL NAA+PROBE: NEGATIVE
SPECIMEN SOURCE: ABNORMAL
T VAGINALIS RRNA VAG QL NAA+PROBE: NEGATIVE

## 2019-11-15 RX ORDER — AZITHROMYCIN 500 MG/1
1000 TABLET, FILM COATED ORAL
Qty: 2 TAB | Refills: 0 | Status: SHIPPED | OUTPATIENT
Start: 2019-11-15 | End: 2019-11-15

## 2019-11-17 NOTE — PROGRESS NOTES
I called pt, Rx for Azithromycin sent, need to get her partner treated also  Advised to follow up with her Gynecologist in 1-2 weeks

## 2019-11-20 ENCOUNTER — TELEPHONE (OUTPATIENT)
Dept: FAMILY MEDICINE CLINIC | Age: 19
End: 2019-11-20

## 2019-11-20 NOTE — TELEPHONE ENCOUNTER
Called pt, she has not followed with gyne and is on the schedule 11/21/19 for gyne issue. Saw Dr. Ashish Cortez last week and was treated for std. Pt is asking to see Dr. Franklin Kearney so she can be tested further to put her mind at ease after exposure. She is asking if Dr. Franklin Kearney can put in lab orders for HIV and HSV. Pt said she doesn't need a pelvic but is requesting lab orders before the lab closes tomorrow. Please advise.

## 2019-11-21 ENCOUNTER — HOSPITAL ENCOUNTER (OUTPATIENT)
Dept: LAB | Age: 19
Discharge: HOME | End: 2019-11-21
Payer: OTHER GOVERNMENT

## 2019-11-21 DIAGNOSIS — Z11.3 SCREEN FOR STD (SEXUALLY TRANSMITTED DISEASE): ICD-10-CM

## 2019-11-21 DIAGNOSIS — Z11.3 SCREEN FOR STD (SEXUALLY TRANSMITTED DISEASE): Primary | ICD-10-CM

## 2019-11-21 PROCEDURE — 36415 COLL VENOUS BLD VENIPUNCTURE: CPT

## 2019-11-21 PROCEDURE — 86695 HERPES SIMPLEX TYPE 1 TEST: CPT

## 2019-11-21 PROCEDURE — 86780 TREPONEMA PALLIDUM: CPT

## 2019-11-21 PROCEDURE — 87389 HIV-1 AG W/HIV-1&-2 AB AG IA: CPT

## 2019-11-21 PROCEDURE — 86696 HERPES SIMPLEX TYPE 2 TEST: CPT

## 2019-11-21 NOTE — TELEPHONE ENCOUNTER
Called pt to cancel appt as provider is out ill. Pt was very nervous as she has had exposure and is positive for one STD already. She asked if a covering dr could please put in lab orders for her for other STDs-specifically HIV and herpes. Please advise.

## 2019-11-22 LAB
HIV 1+2 AB+HIV1 P24 AG SERPL QL IA: NONREACTIVE
HIV12 RESULT COMMENT, HHIVC: NORMAL
HSV1 IGG SER IA-ACNC: <0.91 INDEX (ref 0–0.9)
HSV2 IGG SER IA-ACNC: <0.91 INDEX (ref 0–0.9)
T PALLIDUM AB SER QL IA: NONREACTIVE

## 2020-01-09 ENCOUNTER — TELEPHONE (OUTPATIENT)
Dept: OBGYN CLINIC | Age: 20
End: 2020-01-09

## 2020-01-09 DIAGNOSIS — Z30.09 FAMILY PLANNING: Primary | ICD-10-CM

## 2020-01-09 NOTE — TELEPHONE ENCOUNTER
Patient needs a refill on her birth control medication.  Patient not due for her annual   please send the [facility] on [address] [place] [address]

## 2020-01-14 RX ORDER — LEVONORGESTREL AND ETHINYL ESTRADIOL 0.1-0.02MG
1 KIT ORAL DAILY
Qty: 2 PACKAGE | Refills: 0 | Status: SHIPPED | OUTPATIENT
Start: 2020-01-14 | End: 2020-01-15 | Stop reason: SDUPTHER

## 2020-01-14 NOTE — TELEPHONE ENCOUNTER
Patient called to check status of her birth control refill.  Patient informed she needs an annual. Patient annual scheduled on [email or web address]

## 2020-01-15 DIAGNOSIS — L70.0 ACNE VULGARIS: ICD-10-CM

## 2020-01-15 DIAGNOSIS — Z30.09 FAMILY PLANNING: ICD-10-CM

## 2020-01-15 RX ORDER — LEVONORGESTREL AND ETHINYL ESTRADIOL 0.1-0.02MG
1 KIT ORAL DAILY
Qty: 1 PACKAGE | Refills: 0 | Status: SHIPPED | OUTPATIENT
Start: 2020-01-15 | End: 2020-03-23 | Stop reason: SDUPTHER

## 2020-03-23 ENCOUNTER — OFFICE VISIT (OUTPATIENT)
Dept: FAMILY MEDICINE CLINIC | Age: 20
End: 2020-03-23

## 2020-03-23 VITALS
HEIGHT: 67 IN | RESPIRATION RATE: 16 BRPM | TEMPERATURE: 97.8 F | WEIGHT: 161 LBS | SYSTOLIC BLOOD PRESSURE: 130 MMHG | DIASTOLIC BLOOD PRESSURE: 77 MMHG | BODY MASS INDEX: 25.27 KG/M2 | HEART RATE: 78 BPM | OXYGEN SATURATION: 100 %

## 2020-03-23 DIAGNOSIS — Z11.3 SCREEN FOR STD (SEXUALLY TRANSMITTED DISEASE): ICD-10-CM

## 2020-03-23 DIAGNOSIS — Z30.09 FAMILY PLANNING: ICD-10-CM

## 2020-03-23 DIAGNOSIS — B37.2 CANDIDAL DERMATITIS: ICD-10-CM

## 2020-03-23 DIAGNOSIS — Z01.419 WELL FEMALE EXAM WITH ROUTINE GYNECOLOGICAL EXAM: Primary | ICD-10-CM

## 2020-03-23 DIAGNOSIS — Z87.42 HISTORY OF ABNORMAL CERVICAL PAP SMEAR: ICD-10-CM

## 2020-03-23 RX ORDER — CHLORPHENIRAMINE MALEATE 4 MG
TABLET ORAL 2 TIMES DAILY
Qty: 45 G | Refills: 0 | Status: SHIPPED | OUTPATIENT
Start: 2020-03-23

## 2020-03-23 RX ORDER — LEVONORGESTREL AND ETHINYL ESTRADIOL 0.1-0.02MG
1 KIT ORAL DAILY
Qty: 3 PACKAGE | Refills: 0 | Status: SHIPPED | OUTPATIENT
Start: 2020-03-23

## 2020-03-23 RX ORDER — LEVONORGESTREL AND ETHINYL ESTRADIOL 0.1-0.02MG
1 KIT ORAL DAILY
Qty: 1 PACKAGE | Refills: 0 | Status: SHIPPED | OUTPATIENT
Start: 2020-03-23 | End: 2020-03-23 | Stop reason: SDUPTHER

## 2020-03-23 NOTE — PROGRESS NOTES
SUBJECTIVE:   22 y.o. female for annual routine checkup. HPI:  She is requesting a pap. States she had one scheduled in 2/2020, but it was cancelled and next appt for gyn is 6/2020. Yet, they won't refill her birth control until she gets her pap. Wants STD testing. Pt c/o chaffing in her butt after walking. Has been applying desitin w/o relief. It itches. Problem list updated as part of today's visit. Past medical, surgical, family history reviewed. Social History     Tobacco Use    Smoking status: Never Smoker    Smokeless tobacco: Never Used   Substance Use Topics    Alcohol use: Yes     Comment: occ    Drug use: No       Current Outpatient Medications   Medication Sig Dispense Refill    levonorgestrel-ethinyl estradiol (ORSYTHIA) 0.1-20 mg-mcg tab Take 1 Tab by mouth daily. 1 Package 0    desonide (TRIDESILON) 0.05 % cream Apply  to affected area two (2) times a day. Apply pea-sized amount bid x 7 days prn eczema 60 g 0    montelukast (SINGULAIR) 10 mg tablet Take 10 mg by mouth daily. Indications: Allergic Rhinitis      cetirizine (ZYRTEC) 10 mg tablet Take 1 Tab by mouth daily as needed for Allergies. 90 Tab 1    fluticasone (FLONASE) 50 mcg/actuation nasal spray 2 Sprays by Both Nostrils route daily. 3 Bottle 3    albuterol (PROVENTIL HFA, VENTOLIN HFA, PROAIR HFA) 90 mcg/actuation inhaler Take 2 Puffs by inhalation every four (4) hours as needed for Wheezing. 3 Inhaler 1     Allergies: Patient has no known allergies. Patient's last menstrual period was 03/13/2020. ROS:  Feeling well. No dyspnea or chest pain on exertion. No abdominal pain, change in bowel habits, black or bloody stools. No urinary tract symptoms. GYN ROS: no breast pain or new or enlarging lumps on self exam, no vaginal bleeding. No neurological complaints.     OBJECTIVE:   Visit Vitals  /77 (BP 1 Location: Left arm, BP Patient Position: Sitting)   Pulse 78   Temp 97.8 °F (36.6 °C) (Oral)   Resp 16   Ht 5' 7\" (1.702 m)   Wt 161 lb (73 kg)   LMP 03/13/2020   SpO2 100%   BMI 25.22 kg/m²      Gen: The patient appears well, alert, oriented, in no distress. Pulm: Lungs are clear, good air entry, no wheezes, rhonchi or rales. CV: S1 and S2 normal, no murmurs, regular rate and rhythm. Extremities show no edema, normal peripheral pulses. Pelvic exam: normal external genitalia, vulva, vagina, cervix, uterus and adnexa. +candida in perineum    ASSESSMENT:   well woman    PLAN:   pap smear  return annually or prn   The plan was discussed with the patient. The patient verbalized understanding and is in agreement with the plan. Orders Placed This Encounter    CT/NG/T.VAGINALIS AMPLIFICATION     Standing Status:   Future     Standing Expiration Date:   3/24/2021     Order Specific Question:   Specimen source     Answer:   Vaginal [516]    HIV 1/2 AG/AB, 4TH GENERATION,W RFLX CONFIRM     Standing Status:   Future     Standing Expiration Date:   3/24/2021    HCV AB W/RFLX TO DANYA     Standing Status:   Future     Standing Expiration Date:   3/24/2021    T PALLIDUM AB     Standing Status:   Future     Standing Expiration Date:   3/24/2021    clotrimazole (LOTRIMIN) 1 % topical cream     Sig: Apply  to affected area two (2) times a day. Dispense:  45 g     Refill:  0    PAP + HPV DNA (HIGH RISK)     Standing Status:   Future     Standing Expiration Date:   3/24/2021     Order Specific Question:   Pap Source? Answer:   Cervical     Order Specific Question:   Total Hysterectomy? Answer:   No     Order Specific Question:   Supracervical Hysterectomy? Answer:   No     Order Specific Question:   Post Menopausal?     Answer:   No     Order Specific Question:   Hormone Therapy? Answer:   No     Order Specific Question:   IUD? Answer:   No     Order Specific Question:   Abnormal Bleeding? Answer:   No     Order Specific Question:   Pregnant     Answer:   No     Order Specific Question:   Post Partum? Answer:    No

## 2020-03-23 NOTE — PROGRESS NOTES
Sonny Bustillo is a 22 y.o. female (: 2000) presenting to address:    Chief Complaint   Patient presents with    Gyn Exam       Vitals:    20 1426   Resp: 16   Weight: 161 lb (73 kg)   Height: 5' 7\" (1.702 m)   LMP: 2020       Hearing/Vision:   No exam data present    Learning Assessment:     Learning Assessment 2018   PRIMARY LEARNER Patient   HIGHEST LEVEL OF EDUCATION - PRIMARY LEARNER  2 YEARS OF COLLEGE   BARRIERS PRIMARY LEARNER NONE   CO-LEARNER CAREGIVER No   PRIMARY LANGUAGE ENGLISH    NEED No   LEARNER PREFERENCE PRIMARY READING   LEARNING SPECIAL TOPICS none   ANSWERED BY patient   RELATIONSHIP SELF   ASSESSMENT COMMENT n/a     Depression Screening:     3 most recent PHQ Screens 3/23/2020   Little interest or pleasure in doing things Not at all   Feeling down, depressed, irritable, or hopeless Not at all   Total Score PHQ 2 0     Fall Risk Assessment:     Fall Risk Assessment, last 12 mths 2019   Able to walk? Yes   Fall in past 12 months? No     Abuse Screening:     Abuse Screening Questionnaire 2019   Do you ever feel afraid of your partner? N   Are you in a relationship with someone who physically or mentally threatens you? N   Is it safe for you to go home? Y     Coordination of Care Questionaire:   1. Have you been to the ER, urgent care clinic since your last visit? Hospitalized since your last visit? NO    2. Have you seen or consulted any other health care providers outside of the [address] [place] [address] since your last visit? Include any pap smears or colon screening. NO    Advanced Directive:   1. Do you have an Advanced Directive? NO    2. Would you like information on Advanced Directives?  NO

## 2020-03-24 ENCOUNTER — TELEPHONE (OUTPATIENT)
Dept: FAMILY MEDICINE CLINIC | Age: 20
End: 2020-03-24

## 2020-03-24 NOTE — TELEPHONE ENCOUNTER
[facility] called, needed order for Aptiva swab, orange tube received with no order. Fax  [phone].

## 2020-03-25 LAB
HCV AB S/CO SERPL IA: <0.1 S/CO RATIO (ref 0–0.9)
HCV AB SERPL QL IA: NORMAL
HIV 1+2 AB+HIV1 P24 AG SERPL QL IA: NON REACTIVE
TREPONEMA PALLIDUM IGG+IGM AB [PRESENCE] IN SERUM OR PLASMA BY IMMUNOASSAY: NON REACTIVE

## 2020-03-26 LAB
C TRACH RRNA SPEC QL NAA+PROBE: NEGATIVE
CYTOLOGIST CVX/VAG CYTO: ABNORMAL
CYTOLOGY CVX/VAG DOC CYTO: ABNORMAL
DX ICD CODE: ABNORMAL
HPV I/H RISK 1 DNA CVX QL PROBE+SIG AMP: POSITIVE
Lab: ABNORMAL
N GONORRHOEA RRNA SPEC QL NAA+PROBE: NEGATIVE
OTHER STN SPEC: ABNORMAL
SPECIMEN STATUS REPORT, ROLRST: NORMAL
STAT OF ADQ CVX/VAG CYTO-IMP: ABNORMAL
T VAGINALIS DNA SPEC QL NAA+PROBE: NEGATIVE

## 2022-03-19 PROBLEM — L70.0 ACNE VULGARIS: Status: ACTIVE | Noted: 2018-05-18

## 2022-03-20 PROBLEM — R06.2 WHEEZING: Status: ACTIVE | Noted: 2018-05-18

## 2022-03-20 PROBLEM — J30.1 SEASONAL ALLERGIC RHINITIS DUE TO POLLEN: Status: ACTIVE | Noted: 2018-05-18

## 2022-03-20 PROBLEM — L30.9 ECZEMA: Status: ACTIVE | Noted: 2018-05-18

## 2023-05-21 RX ORDER — FLUTICASONE PROPIONATE 50 MCG
2 SPRAY, SUSPENSION (ML) NASAL DAILY
COMMUNITY
Start: 2018-05-18

## 2023-05-21 RX ORDER — CLOTRIMAZOLE 1 %
CREAM (GRAM) TOPICAL 2 TIMES DAILY
COMMUNITY
Start: 2020-03-23

## 2023-05-21 RX ORDER — LEVONORGESTREL AND ETHINYL ESTRADIOL 0.1-0.02MG
1 KIT ORAL DAILY
COMMUNITY
Start: 2020-03-23

## 2023-05-21 RX ORDER — ALBUTEROL SULFATE 90 UG/1
2 AEROSOL, METERED RESPIRATORY (INHALATION) EVERY 4 HOURS PRN
COMMUNITY
Start: 2018-05-18

## 2023-05-21 RX ORDER — DESONIDE 0.5 MG/G
CREAM TOPICAL 2 TIMES DAILY
COMMUNITY
Start: 2019-04-15

## 2024-12-31 NOTE — TELEPHONE ENCOUNTER
I called pt, positive Chlamydia, Rx for zithromax 1000 mg sent to [facility], advised pt that her partner need to be treated also  Advised pt to follow up with her Gynecologist in 1-2 weeks for full gynecology exam
Pt called to get labs results
Home